# Patient Record
Sex: FEMALE | Race: WHITE | Employment: OTHER | URBAN - METROPOLITAN AREA
[De-identification: names, ages, dates, MRNs, and addresses within clinical notes are randomized per-mention and may not be internally consistent; named-entity substitution may affect disease eponyms.]

---

## 2017-02-13 ENCOUNTER — TELEPHONE (OUTPATIENT)
Dept: INTERNAL MEDICINE CLINIC | Facility: CLINIC | Age: 82
End: 2017-02-13

## 2017-02-13 NOTE — TELEPHONE ENCOUNTER
Incoming (mail or fax):  fax  Received from:  Sergo Washburn. 56. of [facility] SPECIALTY [facility]  Documentation given to:  Dr Fabian Marquez

## 2017-03-28 ENCOUNTER — OFFICE VISIT (OUTPATIENT)
Dept: INTERNAL MEDICINE CLINIC | Facility: CLINIC | Age: 82
End: 2017-03-28

## 2017-03-28 VITALS
DIASTOLIC BLOOD PRESSURE: 70 MMHG | HEIGHT: 64 IN | SYSTOLIC BLOOD PRESSURE: 140 MMHG | WEIGHT: 179 LBS | RESPIRATION RATE: 16 BRPM | BODY MASS INDEX: 30.56 KG/M2 | TEMPERATURE: 98 F | HEART RATE: 88 BPM

## 2017-03-28 DIAGNOSIS — M54.50 CHRONIC BILATERAL LOW BACK PAIN WITHOUT SCIATICA: ICD-10-CM

## 2017-03-28 DIAGNOSIS — R06.00 DYSPNEA ON EXERTION: ICD-10-CM

## 2017-03-28 DIAGNOSIS — Z91.81 AT HIGH RISK FOR FALLS: ICD-10-CM

## 2017-03-28 DIAGNOSIS — Z00.00 ENCOUNTER FOR MEDICARE ANNUAL WELLNESS EXAM: Primary | ICD-10-CM

## 2017-03-28 DIAGNOSIS — G89.29 CHRONIC BILATERAL LOW BACK PAIN WITHOUT SCIATICA: ICD-10-CM

## 2017-03-28 DIAGNOSIS — I10 ESSENTIAL HYPERTENSION: ICD-10-CM

## 2017-03-28 DIAGNOSIS — I77.9 CAROTID ARTERY DISEASE, UNSPECIFIED LATERALITY (HCC): ICD-10-CM

## 2017-03-28 PROCEDURE — 99214 OFFICE O/P EST MOD 30 MIN: CPT | Performed by: INTERNAL MEDICINE

## 2017-03-28 PROCEDURE — G0439 PPPS, SUBSEQ VISIT: HCPCS | Performed by: INTERNAL MEDICINE

## 2017-03-28 NOTE — PATIENT INSTRUCTIONS
Shai Gupta's SCREENING SCHEDULE   Tests on this list are recommended by your physician but may not be covered, or covered at this frequency, by your insurer. Please check with your insurance carrier before scheduling to verify coverage.    Fredy Damon years old and have smoked more than 100 cigarettes in their lifetime   • Anyone with a family history   NA   Colorectal Cancer Screening  Covered up to Age 76     Colonoscopy Screen   Covered every 10 years- more often if abnormal Colonoscopy,10 Years due Influenza  Covered Annually No orders found for this or any previous visit.  Please get every year  DONE    Pneumococcal 13 (Prevnar)  Covered Once after 65   Orders placed or performed in visit on 09/04/15  -PNEUMOCOCCAL VACC, 13 SONALI IM    Please get once Advance Directives.

## 2017-03-28 NOTE — PROGRESS NOTES
HPI:   Agustina Ornelas is a 80year old female who presents for a med check and Medicare Subsequent Annual Wellness visit (Pt already had Initial Annual Wellness). Htn: diet controlled. No chest pain. Has dyspnea on exertion.     Dyspnea on exertion Osteoarthritis, multiple sites     Lumbar spinal stenosis     Cervical spine degeneration     Essential hypertension     Esophageal reflux     DJD (degenerative joint disease)     Vertebrobasilar artery syndrome     SSS (sick sinus syndrome) ([address]     Hype (12/27/2011); Cervical spine degeneration (12/27/2011); Lumbar degenerative disc disease (12/27/2011); Other screening mammogram (6/29/2012); Unspecified essential hypertension (6/29/2012); Disorder of bone and cartilage, unspecified (6/29/2012);  Libby Snow grandfather and mother; Glaucoma in her sister; Heart Disorder in her brother. SOCIAL HISTORY:   She  reports that she has quit smoking. She has never used smokeless tobacco. She reports that she does not drink alcohol or use illicit drugs.      REVIEW OF Pneumovax 09/04/2005       ASSESSMENT AND OTHER RELEVANT CHRONIC CONDITIONS:   Jie Deras is a 80year old female who presents for a Medicare Assessment.      PLAN SUMMARY:   Diagnoses and all orders for this visit:    Encounter for Medicare Emiliano Mckeon Yes    How does the patient maintain a good energy level? :  (works around the house for a hour then rests, repeats several times per day)    How would you describe your daily physical activity?: Light    How would you describe your current health state?: F Yes    Do you have a living will?: Yes     Please go to \"Cognitive Assessment\" under Medicare Assessment section in Charting, test patient and document. Then, refresh your progress note to see your input here.   Cognitive Assessment     What day of the Annually if high risk No results found for: CHLAMYDIA No flowsheet data found.     Screening Mammogram      Mammogram Annually to 76, then as discussed Mammogram,1 Yr due on 12/17/2011 Update Health Maintenance if applicable     Immunizations (Update Immuni A1C, HGBA1C No flowsheet data found. Creat/alb ratio  Annually      LDL  Annually LDL CHOLESTEROL (mg/dL)   Date Value   09/27/2016 108     LDL CHOLESTROL (mg/dL)   Date Value   12/23/2013 120    No flowsheet data found.      Dilated Eye exam  Annually N

## 2017-04-05 PROCEDURE — 88108 CYTOPATH CONCENTRATE TECH: CPT | Performed by: UROLOGY

## 2017-04-22 ENCOUNTER — TELEPHONE (OUTPATIENT)
Dept: INTERNAL MEDICINE CLINIC | Facility: CLINIC | Age: 82
End: 2017-04-22

## 2017-04-22 NOTE — TELEPHONE ENCOUNTER
Incoming (mail or fax):   Fax  Received from:  Sergo Delgado. of [facility]   Documentation given to:  Dr. Pilar Gaffney

## 2017-04-28 ENCOUNTER — PRIOR ORIGINAL RECORDS (OUTPATIENT)
Dept: OTHER | Age: 82
End: 2017-04-28

## 2017-04-28 ENCOUNTER — MYAURORA ACCOUNT LINK (OUTPATIENT)
Dept: OTHER | Age: 82
End: 2017-04-28

## 2017-05-25 ENCOUNTER — HOSPITAL ENCOUNTER (OUTPATIENT)
Dept: CV DIAGNOSTICS | Facility: HOSPITAL | Age: 82
Discharge: HOME | End: 2017-05-25
Attending: INTERNAL MEDICINE

## 2017-05-25 ENCOUNTER — MYAURORA ACCOUNT LINK (OUTPATIENT)
Dept: OTHER | Age: 82
End: 2017-05-25

## 2017-05-25 ENCOUNTER — HOSPITAL ENCOUNTER (OUTPATIENT)
Dept: CARDIOLOGY CLINIC | Facility: HOSPITAL | Age: 82
Discharge: HOME | End: 2017-05-25
Attending: INTERNAL MEDICINE

## 2017-05-25 DIAGNOSIS — I25.10 CORONARY ARTERIOSCLEROSIS: ICD-10-CM

## 2017-05-25 DIAGNOSIS — I34.1 MVP (MITRAL VALVE PROLAPSE): ICD-10-CM

## 2017-05-25 DIAGNOSIS — I65.23 BILATERAL CAROTID ARTERY STENOSIS: ICD-10-CM

## 2017-05-31 ENCOUNTER — PRIOR ORIGINAL RECORDS (OUTPATIENT)
Dept: OTHER | Age: 82
End: 2017-05-31

## 2017-07-12 ENCOUNTER — TELEPHONE (OUTPATIENT)
Dept: INTERNAL MEDICINE CLINIC | Facility: CLINIC | Age: 82
End: 2017-07-12

## 2017-07-12 NOTE — TELEPHONE ENCOUNTER
Incoming (mail or fax): Fax  Received from:   Dr. Staci Vidal at [address] [place] [address]  Documentation given to:  Dr. Tomás Caban consult folder.

## 2017-08-15 ENCOUNTER — PRIOR ORIGINAL RECORDS (OUTPATIENT)
Dept: OTHER | Age: 82
End: 2017-08-15

## 2017-08-15 ENCOUNTER — APPOINTMENT (OUTPATIENT)
Dept: LAB | Age: 82
End: 2017-08-15
Attending: INTERNAL MEDICINE
Payer: MEDICARE

## 2017-08-15 DIAGNOSIS — I10 ESSENTIAL HYPERTENSION: ICD-10-CM

## 2017-08-15 LAB
ALBUMIN SERPL-MCNC: 3.5 G/DL (ref 3.5–4.8)
ALP LIVER SERPL-CCNC: 89 U/L (ref 55–142)
ALT SERPL-CCNC: 14 U/L (ref 14–54)
AST SERPL-CCNC: 16 U/L (ref 15–41)
BILIRUB SERPL-MCNC: 0.5 MG/DL (ref 0.1–2)
BUN BLD-MCNC: 19 MG/DL (ref 8–20)
CALCIUM BLD-MCNC: 9.1 MG/DL (ref 8.3–10.3)
CHLORIDE: 107 MMOL/L (ref 101–111)
CHOLEST SMN-MCNC: 190 MG/DL (ref ?–200)
CO2: 28 MMOL/L (ref 22–32)
CREAT BLD-MCNC: 0.99 MG/DL (ref 0.55–1.02)
GLUCOSE BLD-MCNC: 93 MG/DL (ref 70–99)
HDLC SERPL-MCNC: 68 MG/DL (ref 45–?)
HDLC SERPL: 2.79 {RATIO} (ref ?–4.44)
LDLC SERPL CALC-MCNC: 108 MG/DL (ref ?–130)
LDLC SERPL-MCNC: 14 MG/DL (ref 5–40)
M PROTEIN MFR SERPL ELPH: 7.1 G/DL (ref 6.1–8.3)
NONHDLC SERPL-MCNC: 122 MG/DL (ref ?–130)
POTASSIUM SERPL-SCNC: 4.3 MMOL/L (ref 3.6–5.1)
SODIUM SERPL-SCNC: 142 MMOL/L (ref 136–144)
TRIGLYCERIDES: 71 MG/DL (ref ?–150)

## 2017-08-15 PROCEDURE — 80061 LIPID PANEL: CPT

## 2017-08-15 PROCEDURE — 80053 COMPREHEN METABOLIC PANEL: CPT

## 2017-08-15 PROCEDURE — 36415 COLL VENOUS BLD VENIPUNCTURE: CPT

## 2017-08-17 ENCOUNTER — OFFICE VISIT (OUTPATIENT)
Dept: INTERNAL MEDICINE CLINIC | Facility: CLINIC | Age: 82
End: 2017-08-17

## 2017-08-17 ENCOUNTER — APPOINTMENT (OUTPATIENT)
Dept: LAB | Age: 82
End: 2017-08-17
Attending: INTERNAL MEDICINE
Payer: MEDICARE

## 2017-08-17 ENCOUNTER — TELEPHONE (OUTPATIENT)
Dept: INTERNAL MEDICINE CLINIC | Facility: CLINIC | Age: 82
End: 2017-08-17

## 2017-08-17 VITALS
RESPIRATION RATE: 16 BRPM | HEIGHT: 64 IN | HEART RATE: 88 BPM | TEMPERATURE: 98 F | SYSTOLIC BLOOD PRESSURE: 146 MMHG | WEIGHT: 181 LBS | BODY MASS INDEX: 30.9 KG/M2 | DIASTOLIC BLOOD PRESSURE: 78 MMHG

## 2017-08-17 DIAGNOSIS — R94.01 ABNORMAL EEG: ICD-10-CM

## 2017-08-17 DIAGNOSIS — R51.9 HEADACHE, UNSPECIFIED HEADACHE TYPE: ICD-10-CM

## 2017-08-17 DIAGNOSIS — I10 ESSENTIAL HYPERTENSION: ICD-10-CM

## 2017-08-17 DIAGNOSIS — R51.9 HEADACHE, UNSPECIFIED HEADACHE TYPE: Primary | ICD-10-CM

## 2017-08-17 DIAGNOSIS — T14.8XXA BRUISING: ICD-10-CM

## 2017-08-17 LAB — SED RATE-ML: 31 MM/HR (ref 0–25)

## 2017-08-17 PROCEDURE — 85652 RBC SED RATE AUTOMATED: CPT

## 2017-08-17 PROCEDURE — 36415 COLL VENOUS BLD VENIPUNCTURE: CPT

## 2017-08-17 PROCEDURE — 99214 OFFICE O/P EST MOD 30 MIN: CPT | Performed by: INTERNAL MEDICINE

## 2017-08-17 NOTE — PROGRESS NOTES
[facility]    CHIEF COMPLAINT:  Patient presents with:  Headache: headaches off and on x several months. Headache over right temple area and around the right eye.    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    Umang Carter MD

## 2017-09-20 ENCOUNTER — OFFICE VISIT (OUTPATIENT)
Dept: INTERNAL MEDICINE CLINIC | Facility: CLINIC | Age: 82
End: 2017-09-20

## 2017-09-20 VITALS
BODY MASS INDEX: 30.9 KG/M2 | HEART RATE: 72 BPM | SYSTOLIC BLOOD PRESSURE: 138 MMHG | RESPIRATION RATE: 16 BRPM | DIASTOLIC BLOOD PRESSURE: 72 MMHG | WEIGHT: 181 LBS | HEIGHT: 64 IN

## 2017-09-20 DIAGNOSIS — I10 ESSENTIAL HYPERTENSION: ICD-10-CM

## 2017-09-20 DIAGNOSIS — E78.5 HYPERLIPIDEMIA, UNSPECIFIED HYPERLIPIDEMIA TYPE: Primary | ICD-10-CM

## 2017-09-20 DIAGNOSIS — R94.01 ABNORMAL EEG: ICD-10-CM

## 2017-09-20 DIAGNOSIS — M25.552 LEFT HIP PAIN: ICD-10-CM

## 2017-09-20 PROCEDURE — 99214 OFFICE O/P EST MOD 30 MIN: CPT | Performed by: INTERNAL MEDICINE

## 2017-09-20 PROCEDURE — 90653 IIV ADJUVANT VACCINE IM: CPT | Performed by: INTERNAL MEDICINE

## 2017-09-20 PROCEDURE — G0008 ADMIN INFLUENZA VIRUS VAC: HCPCS | Performed by: INTERNAL MEDICINE

## 2017-09-20 NOTE — PROGRESS NOTES
[address] [facility]    CHIEF COMPLAINT:  Patient presents with: Follow - Up: from last OV        HISTORY OF PRESENT ILLNESS:  Here for follow up   Headache is now resolved.  She sees neuro for abnormal eeg and is on lamictal. She was started on this when Value Ref Range   Sed Rate 31 (H) 0 - 25 mm/Hr            ASSESSMENT AND PLAN:  1. Hyperlipidemia, unspecified hyperlipidemia type  Diet control. Labs ordered to be done prior to next visit. - LIPID PANEL; Future  - COMP METABOLIC PANEL (14);  Future

## 2017-09-21 ENCOUNTER — TELEPHONE (OUTPATIENT)
Dept: INTERNAL MEDICINE CLINIC | Facility: CLINIC | Age: 82
End: 2017-09-21

## 2017-09-21 NOTE — TELEPHONE ENCOUNTER
See if she can see one of his partners or PA sooner  Curb activity, looks like it hurts most when she gets up and walks

## 2017-09-21 NOTE — TELEPHONE ENCOUNTER
Spoke to patient and relayed message below. Patient stated she would follow Dr. Felicitas Dhaliwal instructions.

## 2017-09-21 NOTE — TELEPHONE ENCOUNTER
Patient phoned. She reports that she saw Dr. Enzo Valle yesterday and that a visit with an orthopaedic was recommended.   Patient called to make an appointment, and the next available appointment she can get with someone in the practice (Dr. Adrienne Granados) is two wee

## 2017-10-06 PROBLEM — M70.62 TROCHANTERIC BURSITIS OF LEFT HIP: Status: ACTIVE | Noted: 2017-10-06

## 2018-02-19 ENCOUNTER — TELEPHONE (OUTPATIENT)
Dept: INTERNAL MEDICINE CLINIC | Facility: CLINIC | Age: 83
End: 2018-02-19

## 2018-03-05 ENCOUNTER — OFFICE VISIT (OUTPATIENT)
Dept: INTERNAL MEDICINE CLINIC | Facility: CLINIC | Age: 83
End: 2018-03-05

## 2018-03-05 VITALS
BODY MASS INDEX: 30.8 KG/M2 | SYSTOLIC BLOOD PRESSURE: 130 MMHG | DIASTOLIC BLOOD PRESSURE: 84 MMHG | WEIGHT: 180.38 LBS | TEMPERATURE: 98 F | RESPIRATION RATE: 12 BRPM | HEIGHT: 64 IN | HEART RATE: 100 BPM

## 2018-03-05 DIAGNOSIS — R44.1 VISUAL HALLUCINATIONS: ICD-10-CM

## 2018-03-05 DIAGNOSIS — G89.29 CHRONIC BILATERAL BACK PAIN, UNSPECIFIED BACK LOCATION: ICD-10-CM

## 2018-03-05 DIAGNOSIS — I10 ESSENTIAL HYPERTENSION: Primary | ICD-10-CM

## 2018-03-05 DIAGNOSIS — R94.01 ABNORMAL EEG: ICD-10-CM

## 2018-03-05 DIAGNOSIS — E78.5 HYPERLIPIDEMIA, UNSPECIFIED HYPERLIPIDEMIA TYPE: ICD-10-CM

## 2018-03-05 DIAGNOSIS — M54.9 CHRONIC BILATERAL BACK PAIN, UNSPECIFIED BACK LOCATION: ICD-10-CM

## 2018-03-05 PROCEDURE — 99214 OFFICE O/P EST MOD 30 MIN: CPT | Performed by: INTERNAL MEDICINE

## 2018-03-05 NOTE — PROGRESS NOTES
[address] [facility]    CHIEF COMPLAINT:  Patient presents with: Follow - Up: headaches and other concerns        HISTORY OF PRESENT ILLNESS:  Here for follow up. Htn: off meds. No chest pain, no shortness of breath at goal. At goal today.      Hyperlipi unspecified hyperlipidemia type  Off meds. Do labs prior to next visit. 3. Abnormal EEG  Continue lamictal.   follow up with dr. Brett Olvera. 4. Visual hallucinations  Do the repeat eeg and follow up with dr. Brett Olvera.    Discussed possible psych evaluatio

## 2018-03-16 ENCOUNTER — APPOINTMENT (OUTPATIENT)
Dept: GENERAL RADIOLOGY | Age: 83
End: 2018-03-16
Attending: EMERGENCY MEDICINE
Payer: MEDICARE

## 2018-03-16 ENCOUNTER — HOSPITAL ENCOUNTER (OUTPATIENT)
Age: 83
Discharge: HOME | End: 2018-03-16
Attending: EMERGENCY MEDICINE
Payer: MEDICARE

## 2018-03-16 VITALS
SYSTOLIC BLOOD PRESSURE: 165 MMHG | WEIGHT: 178 LBS | HEART RATE: 96 BPM | RESPIRATION RATE: 20 BRPM | BODY MASS INDEX: 30.39 KG/M2 | OXYGEN SATURATION: 97 % | DIASTOLIC BLOOD PRESSURE: 74 MMHG | TEMPERATURE: 99 F | HEIGHT: 64 IN

## 2018-03-16 DIAGNOSIS — J20.9 ACUTE BRONCHITIS, UNSPECIFIED ORGANISM: Primary | ICD-10-CM

## 2018-03-16 PROCEDURE — 99204 OFFICE O/P NEW MOD 45 MIN: CPT

## 2018-03-16 PROCEDURE — 99213 OFFICE O/P EST LOW 20 MIN: CPT

## 2018-03-16 PROCEDURE — 71046 X-RAY EXAM CHEST 2 VIEWS: CPT | Performed by: EMERGENCY MEDICINE

## 2018-03-16 RX ORDER — BENZONATATE 100 MG/1
100 CAPSULE ORAL 3 TIMES DAILY PRN
Qty: 30 CAPSULE | Refills: 0 | Status: SHIPPED | OUTPATIENT
Start: 2018-03-16 | End: 2018-03-23 | Stop reason: ALTCHOICE

## 2018-03-16 RX ORDER — AZITHROMYCIN 250 MG/1
TABLET, FILM COATED ORAL
Qty: 1 PACKAGE | Refills: 0 | Status: SHIPPED | OUTPATIENT
Start: 2018-03-16 | End: 2018-03-21

## 2018-03-16 NOTE — ED INITIAL ASSESSMENT (HPI)
Pt c/o cough and weakness that started about 1 week ago.  recently with a resp infection was treated with zpack.   States she is always short of breath but doesn't feel it is any worse than normal. Only has pain in the chest after she has been cough

## 2018-03-16 NOTE — ED PROVIDER NOTES
Patient Seen in: [facility]    History   Patient presents with:  Cough/URI    Stated Complaint: cough, weak, chest pain, x3days    HPI    27-year-old female presents to the emergency department complaining of a 3 day histor Lumbar spinal stenosis 12/27/2011   • Macular hole    • Miscarriage 1959   • Osteoarthritis, multiple sites 12/27/2011   • Osteoarthrosis, unspecified whether generalized or localized, unspecified site 6/29/2012   • Other screening mammogram 6/29/2012   • Physical Exam    General appearance: This is an elderly female lying in a gurney. Vital signs were reviewed per nurse's notes. HEENT: Normocephalic atraumatic. Anicteric sclera.   Tympanic memories and oropharynx are normal.  Oral mucosa is moist

## 2018-03-20 ENCOUNTER — TELEPHONE (OUTPATIENT)
Dept: INTERNAL MEDICINE CLINIC | Facility: CLINIC | Age: 83
End: 2018-03-20

## 2018-03-20 NOTE — TELEPHONE ENCOUNTER
Spoke with pt. Seen in  on 3/16/18 for bronchitis. Chest xray clear, no pneumonia. Took last zpack dose today at 2pm, does not feel much better overall. Benzonatate was not helpful.  Feels somewhat weak which fluctuates throughout day, better after restin

## 2018-03-20 NOTE — TELEPHONE ENCOUNTER
Patient was seen next door Urgent Care on 3/16/18, patient called stating she is not feeling better, very weak and still coughing.  Patient stated today will be her last pill from her z-lindsay, patient wanted to schedule a follow up visit with Dr. Suzanne Larios, please

## 2018-03-22 ENCOUNTER — TELEPHONE (OUTPATIENT)
Dept: INTERNAL MEDICINE CLINIC | Facility: CLINIC | Age: 83
End: 2018-03-22

## 2018-03-22 NOTE — TELEPHONE ENCOUNTER
Called and spoke with the patient. The  DC her on 03/16/18 with a Z-Woodrow and Benzonatate cough medicine. The patient states that her symptoms are better (less coughing and less SOB), and she feels better overall.  The patient states she has no fever, but a

## 2018-03-22 NOTE — TELEPHONE ENCOUNTER
Please triage this 81 y/o coming in to see me tomorrow for betsy from , was not feeling better on phone call 3/20

## 2018-03-23 ENCOUNTER — OFFICE VISIT (OUTPATIENT)
Dept: INTERNAL MEDICINE CLINIC | Facility: CLINIC | Age: 83
End: 2018-03-23

## 2018-03-23 VITALS
HEIGHT: 64 IN | RESPIRATION RATE: 16 BRPM | DIASTOLIC BLOOD PRESSURE: 80 MMHG | OXYGEN SATURATION: 96 % | TEMPERATURE: 98 F | SYSTOLIC BLOOD PRESSURE: 140 MMHG | WEIGHT: 176 LBS | HEART RATE: 101 BPM | BODY MASS INDEX: 30.05 KG/M2

## 2018-03-23 DIAGNOSIS — J06.9 URI WITH COUGH AND CONGESTION: Primary | ICD-10-CM

## 2018-03-23 PROCEDURE — 99213 OFFICE O/P EST LOW 20 MIN: CPT | Performed by: INTERNAL MEDICINE

## 2018-03-23 NOTE — PROGRESS NOTES
Conrado Gonzalez is a 80year old female  Patient presents with:  Cough: past 10 days       HPI:   Phone call yestserday:     Called and spoke with the patient. The  DC her on 03/16/18 with a Z-Woodrow and Benzonatate cough medicine.  The patient states t replacement of left hip     Trochanteric bursitis of left hip     Social History:  Smoking status: Former Smoker                                                              Packs/day: 0.00      Years: 2.00      Smokeless tobacco: Never Used FINDINGS:  The patient is rotated to the right of midline. This obscures a portion of the right upper chest. Normal heart size and pulmonary vascularity. No pleural effusion or pneumothorax. No lobar consolidation.   Tortuous thoracic aorta with   calcif

## 2018-03-28 ENCOUNTER — TELEPHONE (OUTPATIENT)
Dept: INTERNAL MEDICINE CLINIC | Facility: CLINIC | Age: 83
End: 2018-03-28

## 2018-03-28 NOTE — TELEPHONE ENCOUNTER
Called Dr. Dio Barfield they are no longer doing regular foot care for medicare patients. Called Dr Celi Blue office and verified they do take her insurance. Called and notified patient. Patient verbalized understanding.  Patient will call and see wh

## 2018-05-07 ENCOUNTER — MYAURORA ACCOUNT LINK (OUTPATIENT)
Dept: OTHER | Age: 83
End: 2018-05-07

## 2018-05-07 ENCOUNTER — PRIOR ORIGINAL RECORDS (OUTPATIENT)
Dept: OTHER | Age: 83
End: 2018-05-07

## 2018-05-09 LAB
ALBUMIN: 3.5 G/DL
ALKALINE PHOSPHATATE(ALK PHOS): 89 IU/L
BILIRUBIN TOTAL: 0.5 MG/DL
BUN: 19 MG/DL
CALCIUM: 9.1 MG/DL
CHLORIDE: 107 MEQ/L
CHOLESTEROL, TOTAL: 190 MG/DL
CREATININE, SERUM: 0.99 MG/DL
GLUCOSE: 93 MG/DL
HDL CHOLESTEROL: 68 MG/DL
LDL CHOLESTEROL: 108 MG/DL
POTASSIUM, SERUM: 4.3 MEQ/L
PROTEIN, TOTAL: 7.1 G/DL
SGOT (AST): 16 IU/L
SGPT (ALT): 14 IU/L
SODIUM: 142 MEQ/L
TRIGLYCERIDES: 71 MG/DL

## 2018-05-22 ENCOUNTER — TELEPHONE (OUTPATIENT)
Dept: INTERNAL MEDICINE CLINIC | Facility: CLINIC | Age: 83
End: 2018-05-22

## 2018-05-22 NOTE — TELEPHONE ENCOUNTER
Incoming (mail or fax):  fax  Received from:  Dr. Iris Han  Documentation given to:  Dr. Melinda Subramanian

## 2018-05-31 PROBLEM — H90.3 SENSORINEURAL HEARING LOSS (SNHL) OF BOTH EARS: Status: ACTIVE | Noted: 2018-05-31

## 2018-06-14 ENCOUNTER — APPOINTMENT (OUTPATIENT)
Dept: LAB | Age: 83
End: 2018-06-14
Attending: INTERNAL MEDICINE
Payer: MEDICARE

## 2018-06-14 DIAGNOSIS — E78.5 HYPERLIPIDEMIA, UNSPECIFIED HYPERLIPIDEMIA TYPE: ICD-10-CM

## 2018-06-14 PROCEDURE — 80061 LIPID PANEL: CPT

## 2018-06-14 PROCEDURE — 36415 COLL VENOUS BLD VENIPUNCTURE: CPT

## 2018-06-14 PROCEDURE — 80053 COMPREHEN METABOLIC PANEL: CPT

## 2018-06-27 ENCOUNTER — OFFICE VISIT (OUTPATIENT)
Dept: INTERNAL MEDICINE CLINIC | Facility: CLINIC | Age: 83
End: 2018-06-27

## 2018-06-27 VITALS
TEMPERATURE: 98 F | HEART RATE: 80 BPM | DIASTOLIC BLOOD PRESSURE: 72 MMHG | HEIGHT: 64 IN | WEIGHT: 175.88 LBS | SYSTOLIC BLOOD PRESSURE: 144 MMHG | BODY MASS INDEX: 30.03 KG/M2 | RESPIRATION RATE: 16 BRPM

## 2018-06-27 DIAGNOSIS — R94.01 ABNORMAL EEG: ICD-10-CM

## 2018-06-27 DIAGNOSIS — I65.29 STENOSIS OF CAROTID ARTERY, UNSPECIFIED LATERALITY: ICD-10-CM

## 2018-06-27 DIAGNOSIS — Z00.00 ENCOUNTER FOR ANNUAL HEALTH EXAMINATION: ICD-10-CM

## 2018-06-27 DIAGNOSIS — I10 ESSENTIAL HYPERTENSION: ICD-10-CM

## 2018-06-27 PROCEDURE — G0439 PPPS, SUBSEQ VISIT: HCPCS | Performed by: INTERNAL MEDICINE

## 2018-06-27 PROCEDURE — 99213 OFFICE O/P EST LOW 20 MIN: CPT | Performed by: INTERNAL MEDICINE

## 2018-06-27 RX ORDER — DORZOLAMIDE HCL 20 MG/ML
1 SOLUTION/ DROPS OPHTHALMIC 2 TIMES DAILY
COMMUNITY
End: 2021-06-14 | Stop reason: CLARIF

## 2018-06-27 NOTE — PATIENT INSTRUCTIONS
Perri Gupta's SCREENING SCHEDULE   Tests on this list are recommended by your physician but may not be covered, or covered at this frequency, by your insurer. Please check with your insurance carrier before scheduling to verify coverage.    Jessiac Elkins criteria:   • Men who are 73-68 years old and have smoked more than 100 cigarettes in their lifetime   • Anyone with a family history    Colorectal Cancer Screening  Covered up to Age 76     Colonoscopy Screen   Covered every 10 years- more often if abnorm Immunizations      Influenza  Covered Annually No orders found for this or any previous visit.  Please get every year    Pneumococcal 13 (Prevnar)  Covered Once after 65   Orders placed or performed in visit on 09/04/15  -PNEUMOCOCCAL VACC, 13 SONALI IM    P

## 2018-06-27 NOTE — PROGRESS NOTES
HPI:   María Louis is a 80year old female who presents for a med check and Medicare Subsequent Annual Wellness visit (Pt already had Initial Annual Wellness). Htn: slightly elevated today. Not on meds any more.  Has been well controlled at home on screening of functional status. Vision Problems? : Yes   She has Walking problems based on screening of functional status. Difficulty walking?: Yes   She has problems with Daily Activities based on screening of functional status.    Problems with leo bowel syndrome)     Diverticulosis     Internal hemorrhoids     Hearing loss     Change in vision     Abnormal EEG     Status post total left knee replacement     Status post total replacement of left hip     Trochanteric bursitis of left hip     Sensorine habits; Change in vision (6/14/2012); Chronic neck pain (3/5/2012); Conjunctivitis; Constipation (6/6/2012); Disorder of bone and cartilage, unspecified (6/29/2012); Diverticulosis (3/3/2005);  Dizziness (6/14/2012); DJD (degenerative joint disease); DJD (d reports that she has quit smoking. She quit after 2.00 years of use. She has never used smokeless tobacco. She reports that she does not drink alcohol or use drugs.      REVIEW OF SYSTEMS:   See hpi    EXAM:   /72 (BP Location: Left arm, Patient Posit testing normal.   High fall risk. Discussed fall precautions. Seeing eye doctor yearly. Essential hypertension  -     COMP METABOLIC PANEL (14); Future  Diet controlled. Will monitor.      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 No flowsheet data found. Fecal Occult Blood Annually No results found for: FOB No flowsheet data found.     Glaucoma Screening      Ophthalmology Visit Annually: Diabetics, FHx Glaucoma, AA>50, > 65 No flowsheet d MONITORING Internal Lab or Procedure External Lab or Procedure      Annual Monitoring of Persistent     Medications (ACE/ARB, digoxin diuretics, anticonvulsants.)    Potassium  Annually Potassium (mmol/L)   Date Value   06/14/2018 4.1     POTASSIUM (mmol/L

## 2018-10-06 ENCOUNTER — HOSPITAL ENCOUNTER (OUTPATIENT)
Age: 83
Discharge: ANOTHER HOSPITAL | End: 2018-10-06
Attending: FAMILY MEDICINE
Payer: MEDICARE

## 2018-10-06 ENCOUNTER — HOSPITAL ENCOUNTER (EMERGENCY)
Facility: HOSPITAL | Age: 83
Discharge: HOME | End: 2018-10-06
Attending: EMERGENCY MEDICINE
Payer: MEDICARE

## 2018-10-06 ENCOUNTER — APPOINTMENT (OUTPATIENT)
Dept: ULTRASOUND IMAGING | Facility: HOSPITAL | Age: 83
End: 2018-10-06
Attending: EMERGENCY MEDICINE
Payer: MEDICARE

## 2018-10-06 VITALS
WEIGHT: 170 LBS | BODY MASS INDEX: 29.02 KG/M2 | HEART RATE: 95 BPM | HEIGHT: 64 IN | RESPIRATION RATE: 16 BRPM | OXYGEN SATURATION: 96 % | TEMPERATURE: 99 F | SYSTOLIC BLOOD PRESSURE: 167 MMHG | DIASTOLIC BLOOD PRESSURE: 88 MMHG

## 2018-10-06 VITALS
DIASTOLIC BLOOD PRESSURE: 78 MMHG | OXYGEN SATURATION: 98 % | TEMPERATURE: 97 F | HEIGHT: 64 IN | WEIGHT: 178 LBS | RESPIRATION RATE: 16 BRPM | BODY MASS INDEX: 30.39 KG/M2 | HEART RATE: 94 BPM | SYSTOLIC BLOOD PRESSURE: 167 MMHG

## 2018-10-06 DIAGNOSIS — M79.89 LEFT LEG SWELLING: Primary | ICD-10-CM

## 2018-10-06 DIAGNOSIS — R60.9 PERIPHERAL EDEMA: Primary | ICD-10-CM

## 2018-10-06 PROCEDURE — 99284 EMERGENCY DEPT VISIT MOD MDM: CPT

## 2018-10-06 PROCEDURE — 93971 EXTREMITY STUDY: CPT | Performed by: EMERGENCY MEDICINE

## 2018-10-06 PROCEDURE — 99212 OFFICE O/P EST SF 10 MIN: CPT

## 2018-10-06 PROCEDURE — 99213 OFFICE O/P EST LOW 20 MIN: CPT

## 2018-10-06 RX ORDER — FUROSEMIDE 20 MG/1
20 TABLET ORAL DAILY
Qty: 3 TABLET | Refills: 0 | Status: SHIPPED | OUTPATIENT
Start: 2018-10-06 | End: 2018-10-16

## 2018-10-06 NOTE — ED PROVIDER NOTES
Patient Seen in: [facility] Emergency Department    History   Patient presents with:  Deep Vein Thrombosis (cardiovascular)  Swelling Edema (cardiovascular, metabolic)    Stated Complaint: r/o dvt    HPI    55-year-old female presents emergency depa Lumbar degenerative disc disease 12/27/2011   • Lumbar spinal stenosis 12/27/2011   • Macular hole    • Miscarriage 1959   • Osteoarthritis, multiple sites 12/27/2011   • Osteoarthrosis, unspecified whether generalized or localized, unspecified site 6/29/2 no acute distress  HEENT: Pupils equal react to light extraocular muscles intact no scleral icterus, mucous membranes are moist, there is no erythema or exudate in the posterior pharynx  Neck: Supple no JVD no lymphadenopathy no meningismus no carotid brui Prescribed:  Current Discharge Medication List    START taking these medications    furosemide 20 MG Oral Tab  Take 1 tablet (20 mg total) by mouth daily.   Qty: 3 tablet Refills: 0

## 2018-10-06 NOTE — ED INITIAL ASSESSMENT (HPI)
History of leg swelling and has been getting worse over the past 2 weeks. Has redness/disocoloration to the left leg and states the leg feels tighter than the right. Also states she has been itching x 3 weeks.   Denies difficulty breathing, chest pain or

## 2018-10-06 NOTE — ED PROVIDER NOTES
Patient Seen in: [address] [facility]    History   Patient presents with:  Swelling Edema (cardiovascular, metabolic)    Stated Complaint: leg swelling     HPI    80-year-old female presents today for evaluation of left lower leg swe 2003   • Lumbar degenerative disc disease 12/27/2011   • Lumbar spinal stenosis 12/27/2011   • Macular hole    • Miscarriage 1959   • Osteoarthritis, multiple sites 12/27/2011   • Osteoarthrosis, unspecified whether generalized or localized, unspecified si Physical Exam    Patient is alert oriented ×3 in no acute distress   conjunctiva clear no icterus  Neck supple full range of motion,   Lungs clear to auscultation bilaterally  Heart S1-S2 heard no murmurs no gallops  Left lower extremity is slightl

## 2018-10-12 ENCOUNTER — TELEPHONE (OUTPATIENT)
Dept: INTERNAL MEDICINE CLINIC | Facility: CLINIC | Age: 83
End: 2018-10-12

## 2018-10-12 NOTE — TELEPHONE ENCOUNTER
Patient was in the UC for a swollen left leg (from the knee down) on Saturday, 10/06/18, and was instructed to call for a follow up appointment with Dr. Ulysses Lynne.  She called today stating that the swelling of her leg is back and she wants more Furosemide, demi

## 2018-10-12 NOTE — TELEPHONE ENCOUNTER
Patient was in [facility] ER on October 6th for Peripheral Edema in her left leg. Patient was prescribed 3 days worth of furosemide and patient stated the swelling went down. Now that she is back on her feet the patient is starting to see swelling again.  Lori

## 2018-10-15 NOTE — TELEPHONE ENCOUNTER
Spoke to patient, scheduled appt in acute spot tomorrow at 10:20am, cancelled appt for 10/25/18. Patient states swelling in the leg is still present, but not worse.  Patient is keeping the foot elevated as much as possible and taking her medication as presc

## 2018-10-16 ENCOUNTER — OFFICE VISIT (OUTPATIENT)
Dept: INTERNAL MEDICINE CLINIC | Facility: CLINIC | Age: 83
End: 2018-10-16
Payer: MEDICARE

## 2018-10-16 VITALS
WEIGHT: 171.63 LBS | BODY MASS INDEX: 29.3 KG/M2 | SYSTOLIC BLOOD PRESSURE: 130 MMHG | DIASTOLIC BLOOD PRESSURE: 70 MMHG | TEMPERATURE: 98 F | HEART RATE: 76 BPM | RESPIRATION RATE: 16 BRPM | HEIGHT: 64 IN

## 2018-10-16 DIAGNOSIS — R60.0 LEG EDEMA, LEFT: Primary | ICD-10-CM

## 2018-10-16 DIAGNOSIS — I10 ESSENTIAL HYPERTENSION: ICD-10-CM

## 2018-10-16 PROCEDURE — G0008 ADMIN INFLUENZA VIRUS VAC: HCPCS | Performed by: INTERNAL MEDICINE

## 2018-10-16 PROCEDURE — 99214 OFFICE O/P EST MOD 30 MIN: CPT | Performed by: INTERNAL MEDICINE

## 2018-10-16 PROCEDURE — 90653 IIV ADJUVANT VACCINE IM: CPT | Performed by: INTERNAL MEDICINE

## 2018-10-16 RX ORDER — FUROSEMIDE 20 MG/1
20 TABLET ORAL DAILY
Qty: 7 TABLET | Refills: 0 | Status: SHIPPED | OUTPATIENT
Start: 2018-10-16 | End: 2018-11-28

## 2018-10-16 NOTE — PROGRESS NOTES
[facility]    CHIEF COMPLAINT:  Patient presents with:  ER F/U: continues to experience swelling in both legs left worse than right.    Imm/Inj: requesting flu shot if ok with Dr. Ousmane Tobar:  Here for follow up er vis Value Ref Range    Cholesterol, Total 188 <200 mg/dL    Triglycerides 67 <150 mg/dL    HDL Cholesterol 59 >45 mg/dL    LDL Cholesterol 116 <130 mg/dL    VLDL 13 5 - 40 mg/dL    Chol/HDL Ratio 3.19 <4.44    Non HDL Chol 129 <130 mg/dL   COMP METABOLIC PANEL

## 2018-10-19 ENCOUNTER — HOSPITAL ENCOUNTER (OUTPATIENT)
Dept: CT IMAGING | Facility: HOSPITAL | Age: 83
Discharge: HOME | End: 2018-10-19
Attending: INTERNAL MEDICINE
Payer: MEDICARE

## 2018-10-19 DIAGNOSIS — R60.0 LEG EDEMA, LEFT: ICD-10-CM

## 2018-10-19 PROCEDURE — 74176 CT ABD & PELVIS W/O CONTRAST: CPT | Performed by: INTERNAL MEDICINE

## 2018-10-24 NOTE — PROGRESS NOTES
Spoke to pt, aware of recommendations. Pt voiced understanding and will call central scheduling to set up appt.

## 2018-10-29 ENCOUNTER — HOSPITAL ENCOUNTER (OUTPATIENT)
Dept: ULTRASOUND IMAGING | Facility: HOSPITAL | Age: 83
Discharge: HOME | End: 2018-10-29
Attending: INTERNAL MEDICINE
Payer: MEDICARE

## 2018-10-29 DIAGNOSIS — R60.0 LEG EDEMA, LEFT: ICD-10-CM

## 2018-10-29 PROCEDURE — 93971 EXTREMITY STUDY: CPT | Performed by: INTERNAL MEDICINE

## 2018-11-16 ENCOUNTER — TELEPHONE (OUTPATIENT)
Dept: INTERNAL MEDICINE CLINIC | Facility: CLINIC | Age: 83
End: 2018-11-16

## 2018-11-16 NOTE — TELEPHONE ENCOUNTER
Dr. Iyer Later, the patient's podiatrist, called in regards to the patient's lower left leg.  He is aware that Dr. Bridger Mccoy checked for DVT, but he stated that today her lower left calf is red and warm to the touch and he stated that it appears that the swelling

## 2018-11-16 NOTE — TELEPHONE ENCOUNTER
Spoke with Dr. Diane Bowling regarding Dr. Krystin Morales phone call this morning (below). Per Dr. Diane Bowling, it's okay for Dr. Gris Moon to treat the patient with antibiotics and have the patient follow up with her.  Dr. Gris Moon prescribed Clindamycin HCl 300 mg BID x 14 da

## 2018-11-28 ENCOUNTER — OFFICE VISIT (OUTPATIENT)
Dept: INTERNAL MEDICINE CLINIC | Facility: CLINIC | Age: 83
End: 2018-11-28
Payer: MEDICARE

## 2018-11-28 VITALS
TEMPERATURE: 99 F | WEIGHT: 173.63 LBS | RESPIRATION RATE: 16 BRPM | HEART RATE: 86 BPM | SYSTOLIC BLOOD PRESSURE: 124 MMHG | DIASTOLIC BLOOD PRESSURE: 62 MMHG | BODY MASS INDEX: 29.64 KG/M2 | HEIGHT: 64 IN

## 2018-11-28 DIAGNOSIS — I89.0 LYMPHEDEMA OF LEFT LEG: ICD-10-CM

## 2018-11-28 DIAGNOSIS — N13.5 UPJ (URETEROPELVIC JUNCTION) OBSTRUCTION: Primary | ICD-10-CM

## 2018-11-28 DIAGNOSIS — I10 ESSENTIAL HYPERTENSION: ICD-10-CM

## 2018-11-28 PROCEDURE — 99214 OFFICE O/P EST MOD 30 MIN: CPT | Performed by: INTERNAL MEDICINE

## 2018-11-28 RX ORDER — CLINDAMYCIN HYDROCHLORIDE 300 MG/1
300 CAPSULE ORAL 2 TIMES DAILY
COMMUNITY
End: 2019-08-19

## 2018-11-28 RX ORDER — BETAXOLOL HYDROCHLORIDE 5 MG/ML
SOLUTION/ DROPS OPHTHALMIC
Refills: 0 | COMMUNITY
Start: 2018-11-05 | End: 2021-06-14 | Stop reason: CLARIF

## 2018-11-28 NOTE — PROGRESS NOTES
[facility]    CHIEF COMPLAINT:  Patient presents with: Follow - Up: cellulitis        HISTORY OF PRESENT ILLNESS:  Here for follow up. Still with left leg swelling. She was at podiatry and they thought the leg looked cellulitic.  She was starte bilaterally.      DATA:  Results for orders placed or performed in visit on 06/14/18   LIPID PANEL   Result Value Ref Range    Cholesterol, Total 188 <200 mg/dL    Triglycerides 67 <150 mg/dL    HDL Cholesterol 59 >45 mg/dL    LDL Cholesterol 116 <130 mg/dL

## 2019-01-01 ENCOUNTER — EXTERNAL RECORD (OUTPATIENT)
Dept: CARDIOLOGY | Age: 84
End: 2019-01-01

## 2019-01-02 ENCOUNTER — PRIOR ORIGINAL RECORDS (OUTPATIENT)
Dept: OTHER | Age: 84
End: 2019-01-02

## 2019-01-02 ENCOUNTER — MYAURORA ACCOUNT LINK (OUTPATIENT)
Dept: OTHER | Age: 84
End: 2019-01-02

## 2019-02-11 ENCOUNTER — TELEPHONE (OUTPATIENT)
Dept: INTERNAL MEDICINE CLINIC | Facility: CLINIC | Age: 84
End: 2019-02-11

## 2019-02-11 DIAGNOSIS — I89.0 LYMPHEDEMA OF LEFT LEG: Primary | ICD-10-CM

## 2019-02-11 NOTE — TELEPHONE ENCOUNTER
Spoke to patient, patient aware to call Dr. Magalys Colbert office to schedule appt. Patient expressed understanding. Information below given. Uzma Ring MD  Vascular Surgery  [facility] CV Surgery  [address] [facility] [address] [facility],[address]

## 2019-02-11 NOTE — TELEPHONE ENCOUNTER
Patient calling in complaining of her lymphedema in her legs itching, patient is wondering whilst her  is out to the store before the weather if there is anything OTC to  for this itching.  Patient denies any open spots on the leg, wounds or r

## 2019-02-11 NOTE — TELEPHONE ENCOUNTER
Per pt Dr Bernard Landa has retired, needs another recommendation please. Also, pt has a question re edema itching. Please advise.  Thank you

## 2019-02-28 VITALS
BODY MASS INDEX: 29.88 KG/M2 | HEART RATE: 98 BPM | DIASTOLIC BLOOD PRESSURE: 70 MMHG | HEIGHT: 64 IN | SYSTOLIC BLOOD PRESSURE: 132 MMHG | WEIGHT: 175 LBS

## 2019-02-28 VITALS
SYSTOLIC BLOOD PRESSURE: 132 MMHG | DIASTOLIC BLOOD PRESSURE: 80 MMHG | BODY MASS INDEX: 29.53 KG/M2 | HEART RATE: 64 BPM | RESPIRATION RATE: 16 BRPM | HEIGHT: 64 IN | WEIGHT: 173 LBS

## 2019-03-01 VITALS
SYSTOLIC BLOOD PRESSURE: 158 MMHG | WEIGHT: 179 LBS | BODY MASS INDEX: 30.56 KG/M2 | DIASTOLIC BLOOD PRESSURE: 80 MMHG | HEIGHT: 64 IN | HEART RATE: 100 BPM

## 2019-03-07 ENCOUNTER — HOSPITAL ENCOUNTER (OUTPATIENT)
Dept: OCCUPATIONAL MEDICINE | Facility: HOSPITAL | Age: 84
Setting detail: THERAPIES SERIES
Discharge: HOME | End: 2019-03-07
Attending: NURSE PRACTITIONER
Payer: MEDICARE

## 2019-03-07 PROCEDURE — 97166 OT EVAL MOD COMPLEX 45 MIN: CPT

## 2019-03-07 PROCEDURE — 97165 OT EVAL LOW COMPLEX 30 MIN: CPT

## 2019-03-07 NOTE — PROGRESS NOTES
OCCUPATIONAL THERAPY LE LYMPHEDEMA EVALUATION:   Referring Physician: Dr. Veronica Kramer  Diagnosis: lymphedema BLE      Date of Service: 3/7/2019     PATIENT SUMMARY   Mike Delaney is a 80year old y/o female who presents  With improved tho swollen bila garments. Therapist feels Pt will not need much manual lymphatic drainage tx as garments  Are working well for Pt.     [place] [employer] would benefit from skilled Occupational Therapy for : reduction of BLE excess fluid volume specifically ankles    Education or t 90 day period.    Treatment will include: Complete Decongestive Therapy: Manual Therapy, Self-Care/Home Management Training, Therapeutic Exercise, ,     Patient/Family/Caregiver was advised of these findings, precautions, and treatment options and has agree

## 2019-03-13 ENCOUNTER — HOSPITAL ENCOUNTER (OUTPATIENT)
Dept: OCCUPATIONAL MEDICINE | Facility: HOSPITAL | Age: 84
Setting detail: THERAPIES SERIES
Discharge: HOME | End: 2019-03-13
Attending: NURSE PRACTITIONER
Payer: MEDICARE

## 2019-03-13 PROCEDURE — 97140 MANUAL THERAPY 1/> REGIONS: CPT

## 2019-03-13 NOTE — PROGRESS NOTES
Dx: lymphedema BLE         Authorized # of Visits:  2/12      Next MD visit/plan renewal:  None per Pt  Fall Risk: Standard          Precautions:  Lymphedema; 61+ y/o          Subjective:   Pt reports:  Legs are reducing nicely and consistently since weari

## 2019-03-19 ENCOUNTER — LAB ENCOUNTER (OUTPATIENT)
Dept: LAB | Age: 84
End: 2019-03-19
Attending: INTERNAL MEDICINE
Payer: MEDICARE

## 2019-03-19 DIAGNOSIS — I10 ESSENTIAL HYPERTENSION: ICD-10-CM

## 2019-03-19 LAB
ALBUMIN SERPL-MCNC: 3.6 G/DL (ref 3.4–5)
ALBUMIN/GLOB SERPL: 1 {RATIO} (ref 1–2)
ALP LIVER SERPL-CCNC: 88 U/L (ref 55–142)
ALT SERPL-CCNC: 17 U/L (ref 13–56)
ANION GAP SERPL CALC-SCNC: 6 MMOL/L (ref 0–18)
AST SERPL-CCNC: 12 U/L (ref 15–37)
BILIRUB SERPL-MCNC: 0.8 MG/DL (ref 0.1–2)
BUN BLD-MCNC: 16 MG/DL (ref 7–18)
BUN/CREAT SERPL: 17 (ref 10–20)
CALCIUM BLD-MCNC: 9.4 MG/DL (ref 8.5–10.1)
CHLORIDE SERPL-SCNC: 106 MMOL/L (ref 98–107)
CO2 SERPL-SCNC: 28 MMOL/L (ref 21–32)
CREAT BLD-MCNC: 0.94 MG/DL (ref 0.55–1.02)
GLOBULIN PLAS-MCNC: 3.6 G/DL (ref 2.8–4.4)
GLUCOSE BLD-MCNC: 94 MG/DL (ref 70–99)
M PROTEIN MFR SERPL ELPH: 7.2 G/DL (ref 6.4–8.2)
OSMOLALITY SERPL CALC.SUM OF ELEC: 291 MOSM/KG (ref 275–295)
POTASSIUM SERPL-SCNC: 3.9 MMOL/L (ref 3.5–5.1)
SODIUM SERPL-SCNC: 140 MMOL/L (ref 136–145)

## 2019-03-19 PROCEDURE — 36415 COLL VENOUS BLD VENIPUNCTURE: CPT

## 2019-03-19 PROCEDURE — 80053 COMPREHEN METABOLIC PANEL: CPT

## 2019-03-20 ENCOUNTER — HOSPITAL ENCOUNTER (OUTPATIENT)
Dept: OCCUPATIONAL MEDICINE | Facility: HOSPITAL | Age: 84
Setting detail: THERAPIES SERIES
Discharge: HOME | End: 2019-03-20
Attending: NURSE PRACTITIONER
Payer: MEDICARE

## 2019-03-20 PROCEDURE — 97140 MANUAL THERAPY 1/> REGIONS: CPT

## 2019-03-20 NOTE — PROGRESS NOTES
Dx: lymphedema BLE         Authorized # of Visits:  3/12      Next MD visit/plan renewal:  None per Pt  Fall Risk: Standard          Precautions:  Lymphedema; 61+ y/o          Subjective:   Pt reports:  Legs are reducing nicely and consistently since weari

## 2019-03-25 RX ORDER — LAMOTRIGINE 25 MG/1
TABLET ORAL
COMMUNITY
Start: 2014-10-27 | End: 2020-04-26 | Stop reason: ALTCHOICE

## 2019-03-25 RX ORDER — TRAVOPROST OPHTHALMIC SOLUTION 0.04 MG/ML
SOLUTION OPHTHALMIC
COMMUNITY
End: 2020-04-26 | Stop reason: ALTCHOICE

## 2019-03-25 RX ORDER — BETAXOLOL HYDROCHLORIDE 5 MG/ML
SOLUTION/ DROPS OPHTHALMIC
COMMUNITY
End: 2020-04-26 | Stop reason: ALTCHOICE

## 2019-03-27 ENCOUNTER — HOSPITAL ENCOUNTER (OUTPATIENT)
Dept: OCCUPATIONAL MEDICINE | Facility: HOSPITAL | Age: 84
Setting detail: THERAPIES SERIES
Discharge: HOME | End: 2019-03-27
Attending: NURSE PRACTITIONER
Payer: MEDICARE

## 2019-03-27 PROCEDURE — 97140 MANUAL THERAPY 1/> REGIONS: CPT

## 2019-03-27 NOTE — PROGRESS NOTES
Dx: lymphedema BLE         Authorized # of Visits:  4/12      Next MD visit/plan renewal:  None per Pt  Fall Risk: Standard          Precautions:  Lymphedema; 61+ y/o         Discharge Summary    Pt has attended 4, cancelled 0, and no shown 0 visits in [place] lifelong. Goals:   1- Pt will be independent in daily skin care. 2 sessions. MET  2-  Pt will tolerate bilat LE modified compression via Tensogrip sleeve for 8 hours while sleeping. 2 sessions. Not needed.   3-  Pt will be independent in decongestive e

## 2019-04-03 ENCOUNTER — APPOINTMENT (OUTPATIENT)
Dept: OCCUPATIONAL MEDICINE | Facility: HOSPITAL | Age: 84
End: 2019-04-03
Attending: NURSE PRACTITIONER
Payer: MEDICARE

## 2019-04-10 ENCOUNTER — APPOINTMENT (OUTPATIENT)
Dept: OCCUPATIONAL MEDICINE | Facility: HOSPITAL | Age: 84
End: 2019-04-10
Attending: NURSE PRACTITIONER
Payer: MEDICARE

## 2019-04-17 ENCOUNTER — APPOINTMENT (OUTPATIENT)
Dept: OCCUPATIONAL MEDICINE | Facility: HOSPITAL | Age: 84
End: 2019-04-17
Attending: NURSE PRACTITIONER
Payer: MEDICARE

## 2019-04-24 ENCOUNTER — APPOINTMENT (OUTPATIENT)
Dept: OCCUPATIONAL MEDICINE | Facility: HOSPITAL | Age: 84
End: 2019-04-24
Attending: NURSE PRACTITIONER
Payer: MEDICARE

## 2019-04-29 ENCOUNTER — TELEPHONE (OUTPATIENT)
Dept: CARDIOLOGY | Age: 84
End: 2019-04-29

## 2019-04-29 DIAGNOSIS — I65.23 BILATERAL CAROTID ARTERY STENOSIS: Primary | ICD-10-CM

## 2019-05-03 ENCOUNTER — HOSPITAL ENCOUNTER (OUTPATIENT)
Dept: CARDIOLOGY CLINIC | Facility: HOSPITAL | Age: 84
Discharge: HOME | End: 2019-05-03
Attending: INTERNAL MEDICINE
Payer: MEDICARE

## 2019-05-03 DIAGNOSIS — I65.23 BILATERAL CAROTID ARTERY STENOSIS: ICD-10-CM

## 2019-05-03 PROCEDURE — 93880 EXTRACRANIAL BILAT STUDY: CPT | Performed by: INTERNAL MEDICINE

## 2019-05-06 ENCOUNTER — OFFICE VISIT (OUTPATIENT)
Dept: CARDIOLOGY | Age: 84
End: 2019-05-06

## 2019-05-06 VITALS
SYSTOLIC BLOOD PRESSURE: 130 MMHG | HEART RATE: 64 BPM | WEIGHT: 172 LBS | DIASTOLIC BLOOD PRESSURE: 70 MMHG | HEIGHT: 64 IN | BODY MASS INDEX: 29.37 KG/M2

## 2019-05-06 DIAGNOSIS — I25.10 CORONARY ARTERY DISEASE INVOLVING NATIVE CORONARY ARTERY OF NATIVE HEART WITHOUT ANGINA PECTORIS: Primary | ICD-10-CM

## 2019-05-06 DIAGNOSIS — I34.1 NONRHEUMATIC MITRAL VALVE PROLAPSE: ICD-10-CM

## 2019-05-06 DIAGNOSIS — I10 ESSENTIAL HYPERTENSION: ICD-10-CM

## 2019-05-06 DIAGNOSIS — I65.23 BILATERAL CAROTID ARTERY STENOSIS: ICD-10-CM

## 2019-05-06 DIAGNOSIS — I89.0 LYMPHEDEMA: ICD-10-CM

## 2019-05-06 DIAGNOSIS — I49.3 VENTRICULAR PREMATURE DEPOLARIZATION: ICD-10-CM

## 2019-05-06 PROCEDURE — 99214 OFFICE O/P EST MOD 30 MIN: CPT | Performed by: INTERNAL MEDICINE

## 2019-05-06 RX ORDER — LATANOPROST 50 UG/ML
1 SOLUTION/ DROPS OPHTHALMIC NIGHTLY
COMMUNITY
End: 2020-04-26 | Stop reason: ALTCHOICE

## 2019-05-06 ASSESSMENT — ENCOUNTER SYMPTOMS
ALLERGIC/IMMUNOLOGIC COMMENTS: NO NEW FOOD ALLERGIES
COUGH: 0
BRUISES/BLEEDS EASILY: 0
CHILLS: 0
SUSPICIOUS LESIONS: 0
HEMATOCHEZIA: 0
WEIGHT GAIN: 0
WEIGHT LOSS: 0
FEVER: 0
HEMOPTYSIS: 0

## 2019-06-28 ENCOUNTER — TELEPHONE (OUTPATIENT)
Dept: INTERNAL MEDICINE CLINIC | Facility: CLINIC | Age: 84
End: 2019-06-28

## 2019-06-28 NOTE — TELEPHONE ENCOUNTER
Called patient to schedule appointment for AWV (Last AWV 06-27-18). Patient stated she will call back to schedule apt.

## 2019-08-08 ENCOUNTER — TELEPHONE (OUTPATIENT)
Dept: CARDIOLOGY | Age: 84
End: 2019-08-08

## 2019-08-10 ENCOUNTER — APPOINTMENT (OUTPATIENT)
Dept: GENERAL RADIOLOGY | Facility: HOSPITAL | Age: 84
End: 2019-08-10
Payer: MEDICARE

## 2019-08-10 ENCOUNTER — HOSPITAL ENCOUNTER (EMERGENCY)
Facility: HOSPITAL | Age: 84
Discharge: HOME | End: 2019-08-10
Attending: EMERGENCY MEDICINE
Payer: MEDICARE

## 2019-08-10 VITALS
HEART RATE: 91 BPM | DIASTOLIC BLOOD PRESSURE: 82 MMHG | SYSTOLIC BLOOD PRESSURE: 163 MMHG | RESPIRATION RATE: 14 BRPM | OXYGEN SATURATION: 96 % | TEMPERATURE: 97 F | HEIGHT: 64 IN | BODY MASS INDEX: 29.02 KG/M2 | WEIGHT: 170 LBS

## 2019-08-10 DIAGNOSIS — M25.531 RIGHT WRIST PAIN: Primary | ICD-10-CM

## 2019-08-10 PROCEDURE — 99283 EMERGENCY DEPT VISIT LOW MDM: CPT

## 2019-08-10 PROCEDURE — 73110 X-RAY EXAM OF WRIST: CPT | Performed by: EMERGENCY MEDICINE

## 2019-08-10 NOTE — ED INITIAL ASSESSMENT (HPI)
Pt to ed with rpts of rt wrist pain [id number]. Pt rpts it has gotten increasingly worse since using adaptive devices on putting weight on her wrist for toileting. No obvious deformity. Hx of arthritis.

## 2019-08-10 NOTE — ED PROVIDER NOTES
Patient Seen in: [facility] Emergency Department    History   Patient presents with:  Upper Extremity Injury (musculoskeletal)    Stated Complaint:     HPI    Patient states that she has been having right wrist pain over the past several weeks.   The heart beat 2003   • Lumbar degenerative disc disease 12/27/2011   • Lumbar spinal stenosis 12/27/2011   • Macular hole    • Miscarriage 1959   • Osteoarthritis, multiple sites 12/27/2011   • Osteoarthrosis, unspecified whether generalized or localized, uns well-nourished. No distress. Musculoskeletal:   Discomfort and mild edema surrounding the patient's right wrist.  No deformity is noted. Distal CMS exam is intact. Nursing note and vitals reviewed.            ED Course   Labs Reviewed - No data to disp

## 2019-08-12 ENCOUNTER — TELEPHONE (OUTPATIENT)
Dept: CARDIOLOGY | Age: 84
End: 2019-08-12

## 2019-08-12 ENCOUNTER — TELEPHONE (OUTPATIENT)
Dept: INTERNAL MEDICINE CLINIC | Facility: CLINIC | Age: 84
End: 2019-08-12

## 2019-08-12 NOTE — TELEPHONE ENCOUNTER
Patient to [facility] ER 8/10/19 for wrist pain. Per ER report, discomfort and mild swelling noted, no deformity, no fracture, CMS intact. Patient discharged with splint. Osteopenia also found on x-ray, no previous DEXA scan noted.  Patient scheduled ER F/U for

## 2019-08-12 NOTE — TELEPHONE ENCOUNTER
Per er notes pain has been present for several weeks. Xray with no fracture. Continue to use splint. See me on 19th as planned, see me sooner if worsening symptoms.

## 2019-08-12 NOTE — TELEPHONE ENCOUNTER
NIRU pt was seen at [address]  ER 08-10-19 scheduled ER f/u apt with  08-19-19 (no openings this week with ).

## 2019-08-14 ENCOUNTER — TELEPHONE (OUTPATIENT)
Dept: INTERNAL MEDICINE CLINIC | Facility: CLINIC | Age: 84
End: 2019-08-14

## 2019-08-14 DIAGNOSIS — I87.2 VENOUS INSUFFICIENCY: Primary | ICD-10-CM

## 2019-08-14 NOTE — TELEPHONE ENCOUNTER
We can try a compression sock donning aid and see if this helps and if it is covered by medicare. She can also talk to the pharmacist as they may sell these otc as well.   dme order done.

## 2019-08-14 NOTE — TELEPHONE ENCOUNTER
Called and spoke with patient. She already has the compression sock donning device. The issue is that it usually takes her and her  for her to be able to use it, and he has also injured one of his arms.  The patient states she will have help from fam

## 2019-08-14 NOTE — TELEPHONE ENCOUNTER
Patient was in the ER on 8/10 for wrist pain/swelling and has scheduled a follow up with Dr Bridger Mccoy on 8/19/19. She also has lymphedema and needs help putting her compression socks on her legs since her legs are swollen. her  is unable to help.   Also

## 2019-08-14 NOTE — TELEPHONE ENCOUNTER
Patient in [facility] ER 8/10/19 for right wrist pain, has ER F/U visit on 8/19/19. Is requesting staff help with putting on her compression stockings. Do you have any advice for where she can get help with this?  I know we'll help her if needed when she's here

## 2019-08-19 ENCOUNTER — OFFICE VISIT (OUTPATIENT)
Dept: INTERNAL MEDICINE CLINIC | Facility: CLINIC | Age: 84
End: 2019-08-19
Payer: MEDICARE

## 2019-08-19 VITALS
HEART RATE: 76 BPM | BODY MASS INDEX: 29.34 KG/M2 | WEIGHT: 171.88 LBS | RESPIRATION RATE: 16 BRPM | HEIGHT: 64 IN | SYSTOLIC BLOOD PRESSURE: 144 MMHG | TEMPERATURE: 99 F | DIASTOLIC BLOOD PRESSURE: 60 MMHG

## 2019-08-19 DIAGNOSIS — I10 ESSENTIAL HYPERTENSION: ICD-10-CM

## 2019-08-19 DIAGNOSIS — I89.0 LYMPHEDEMA OF LEFT LEG: ICD-10-CM

## 2019-08-19 DIAGNOSIS — M25.531 RIGHT WRIST PAIN: Primary | ICD-10-CM

## 2019-08-19 PROCEDURE — 99214 OFFICE O/P EST MOD 30 MIN: CPT | Performed by: INTERNAL MEDICINE

## 2019-08-19 NOTE — PROGRESS NOTES
[facility]    CHIEF COMPLAINT:  Patient presents with:  ER F/U        HISTORY OF PRESENT ILLNESS:  Here for follow up er visit. Seen in er for right wrist pain. Had some swelling and redness.  Had been using her right wrist more to try to push h HSM or tenderness  MUSCULOSKELETAL:  no edema, muscle strength normal.     DATA:  Results for orders placed or performed in visit on 54/01/57   COMP METABOLIC PANEL (14)   Result Value Ref Range    Glucose 94 70 - 99 mg/dL    Sodium 140 136 - 145 mmol/L

## 2019-08-28 ENCOUNTER — TELEPHONE (OUTPATIENT)
Dept: INTERNAL MEDICINE CLINIC | Facility: CLINIC | Age: 84
End: 2019-08-28

## 2019-08-28 DIAGNOSIS — I89.0 LYMPHEDEMA OF LEFT LEG: Primary | ICD-10-CM

## 2019-08-29 NOTE — TELEPHONE ENCOUNTER
----- Message from Pat Herring OT sent at 8/28/2019  3:17 PM CDT -----  Regarding: change orders from PT to OT   Dear Dr Angeline Pierre for this inconvenience.  Please re-write Vivian's order for evaluation  and tx for bilat leg lymphedema changing  I

## 2019-09-04 ENCOUNTER — APPOINTMENT (OUTPATIENT)
Dept: OCCUPATIONAL MEDICINE | Facility: HOSPITAL | Age: 84
End: 2019-09-04
Attending: INTERNAL MEDICINE
Payer: MEDICARE

## 2019-09-09 ENCOUNTER — HOSPITAL ENCOUNTER (OUTPATIENT)
Dept: OCCUPATIONAL MEDICINE | Facility: HOSPITAL | Age: 84
Setting detail: THERAPIES SERIES
Discharge: HOME | End: 2019-09-09
Attending: INTERNAL MEDICINE
Payer: MEDICARE

## 2019-09-09 DIAGNOSIS — I89.0 LYMPHEDEMA OF LEFT LEG: ICD-10-CM

## 2019-09-09 PROCEDURE — 97165 OT EVAL LOW COMPLEX 30 MIN: CPT

## 2019-09-09 NOTE — PROGRESS NOTES
OCCUPATIONAL THERAPY LE LYMPHEDEMA EVALUATION:   Referring Physician: Dr. Fabian Marquez  Diagnosis:  Lymphedema of left leg (I89.0)  Date of Service: 9/9/2019     PATIENT SUMMARY   Gini Chau is a 80year old y/o female who presents : sudden onset of sharlene (+15.9cm)  And still needs compression. * Pt is not as active/mobile as her eyesight is decreasing therefore unable to move lymph fluid in legs via exercise.   * L LE fluid volume is 18.3cm greater today than when Pt completed lymphedema tx last March 2019 sessions  6-  Reduce L LE lymphedema volume by 12cm to allow pt to improve standing and walking balance. 10 sessions  7- Reduce L LE lymphedema density to soft and mobile to reduce infection risk.  12 sessions  8- review proper compression level for pt , co proximal to 1st toe web space  22.8 21.2   5cm proximal to 1st toe web space  20.4 23.0   TOTALS  321.5cm (+15.9cm) 305.6cm   Stemmer's sign  no no

## 2019-09-11 ENCOUNTER — HOSPITAL ENCOUNTER (OUTPATIENT)
Dept: OCCUPATIONAL MEDICINE | Facility: HOSPITAL | Age: 84
Setting detail: THERAPIES SERIES
Discharge: HOME | End: 2019-09-11
Attending: INTERNAL MEDICINE
Payer: MEDICARE

## 2019-09-11 PROCEDURE — 97140 MANUAL THERAPY 1/> REGIONS: CPT

## 2019-09-11 NOTE — PROGRESS NOTES
Dx: Lymphedema of left leg (I89.0)         Authorized # of Visits:  2/12        Next MD visit/plan renewal:  None scheduled  Fall Risk: Standard          Precautions:  Lymphedema;        Subjective:   Pt reports:   Bilateral legs feel good and remain w/o sw Charges: 4x MT   Total Timed Treatment: 55 min  Total Treatment Time: 55 min

## 2019-09-19 ENCOUNTER — HOSPITAL ENCOUNTER (OUTPATIENT)
Dept: OCCUPATIONAL MEDICINE | Facility: HOSPITAL | Age: 84
Setting detail: THERAPIES SERIES
Discharge: HOME | End: 2019-09-19
Attending: INTERNAL MEDICINE
Payer: MEDICARE

## 2019-09-19 PROCEDURE — 97140 MANUAL THERAPY 1/> REGIONS: CPT

## 2019-09-19 NOTE — PROGRESS NOTES
Dx: Lymphedema of left leg (I89.0)         Authorized # of Visits:  3/12        Next MD visit/plan renewal:  None scheduled  Fall Risk: Standard          Precautions:  Lymphedema;        Subjective:   Pt reports:   Bilateral legs feel good and remain w/o sw balance. 10 sessions  7- Reduce L LE lymphedema density to soft and mobile to reduce infection risk.  12 sessions  8- review proper compression level for pt , consider lighter compression knee high to ease donning process and reduce stress on Pt /husbands h

## 2019-09-23 ENCOUNTER — APPOINTMENT (OUTPATIENT)
Dept: OCCUPATIONAL MEDICINE | Facility: HOSPITAL | Age: 84
End: 2019-09-23
Payer: MEDICARE

## 2019-09-26 ENCOUNTER — TELEPHONE (OUTPATIENT)
Dept: OCCUPATIONAL MEDICINE | Facility: HOSPITAL | Age: 84
End: 2019-09-26

## 2019-09-27 ENCOUNTER — APPOINTMENT (OUTPATIENT)
Dept: OCCUPATIONAL MEDICINE | Facility: HOSPITAL | Age: 84
End: 2019-09-27
Attending: INTERNAL MEDICINE
Payer: MEDICARE

## 2019-10-01 ENCOUNTER — TELEPHONE (OUTPATIENT)
Dept: OCCUPATIONAL MEDICINE | Facility: HOSPITAL | Age: 84
End: 2019-10-01

## 2019-10-03 ENCOUNTER — HOSPITAL ENCOUNTER (OUTPATIENT)
Dept: OCCUPATIONAL MEDICINE | Facility: HOSPITAL | Age: 84
Setting detail: THERAPIES SERIES
Discharge: HOME | End: 2019-10-03
Attending: INTERNAL MEDICINE
Payer: MEDICARE

## 2019-10-03 PROCEDURE — 97535 SELF CARE MNGMENT TRAINING: CPT

## 2019-10-03 PROCEDURE — 97140 MANUAL THERAPY 1/> REGIONS: CPT

## 2019-10-03 NOTE — PROGRESS NOTES
Dx: Lymphedema of left leg (I89.0)         Authorized # of Visits:  4/12        Next MD visit/plan renewal:  None scheduled  Fall Risk: Standard          Precautions:  Lymphedema;        Subjective:   Pt reports:   Bilateral legs feel good and remain w/o sw balance. 10 sessions  7- Reduce L LE lymphedema density to soft and mobile to reduce infection risk.  12 sessions  8- review proper compression level for pt , consider lighter compression knee high to ease donning process and reduce stress on Pt /husbands h

## 2019-10-13 ENCOUNTER — APPOINTMENT (OUTPATIENT)
Dept: CT IMAGING | Facility: HOSPITAL | Age: 84
End: 2019-10-13
Attending: EMERGENCY MEDICINE
Payer: MEDICARE

## 2019-10-13 ENCOUNTER — APPOINTMENT (OUTPATIENT)
Dept: GENERAL RADIOLOGY | Facility: HOSPITAL | Age: 84
End: 2019-10-13
Attending: EMERGENCY MEDICINE
Payer: MEDICARE

## 2019-10-13 ENCOUNTER — HOSPITAL ENCOUNTER (EMERGENCY)
Facility: HOSPITAL | Age: 84
Discharge: HOME | End: 2019-10-13
Attending: EMERGENCY MEDICINE
Payer: MEDICARE

## 2019-10-13 VITALS
DIASTOLIC BLOOD PRESSURE: 99 MMHG | TEMPERATURE: 98 F | SYSTOLIC BLOOD PRESSURE: 182 MMHG | RESPIRATION RATE: 18 BRPM | WEIGHT: 171 LBS | OXYGEN SATURATION: 95 % | BODY MASS INDEX: 29.19 KG/M2 | HEIGHT: 64 IN | HEART RATE: 110 BPM

## 2019-10-13 DIAGNOSIS — S00.93XA CONTUSION OF HEAD, UNSPECIFIED PART OF HEAD, INITIAL ENCOUNTER: Primary | ICD-10-CM

## 2019-10-13 DIAGNOSIS — M54.9 BACK PAIN WITH RADIATION: ICD-10-CM

## 2019-10-13 PROCEDURE — 99284 EMERGENCY DEPT VISIT MOD MDM: CPT

## 2019-10-13 PROCEDURE — 70450 CT HEAD/BRAIN W/O DYE: CPT | Performed by: EMERGENCY MEDICINE

## 2019-10-13 PROCEDURE — 72100 X-RAY EXAM L-S SPINE 2/3 VWS: CPT | Performed by: EMERGENCY MEDICINE

## 2019-10-13 RX ORDER — ACETAMINOPHEN 500 MG
1000 TABLET ORAL ONCE
Status: COMPLETED | OUTPATIENT
Start: 2019-10-13 | End: 2019-10-13

## 2019-10-13 NOTE — ED PROVIDER NOTES
Patient Seen in: [facility] Emergency Department      History   Patient presents with:  Fall (musculoskeletal, neurologic)    Stated Complaint: fall, back pain    HPI    Patient here for further evaluation after mechanical fall.   She is walking on h Lumbar degenerative disc disease 12/27/2011   • Lumbar spinal stenosis 12/27/2011   • Macular hole    • Miscarriage 1959   • Osteoarthritis, multiple sites 12/27/2011   • Osteoarthrosis, unspecified whether generalized or localized, unspecified site 6/29/2 appearing  Head: Normocephalic , developed a contusion in the back of her head otherwise atraumatic. No signs of skull fracture no Garcia's no raccoons, normal TMs, no broken skin. No tenderness of the facial bones.     Nose: Nose normal.   Eyes: EOM are she has no midline tenderness. Will discharge home. Tylenol for pain. Close follow-up with PCP if symptoms worsen.               Disposition and Plan     Clinical Impression:  Contusion of head, unspecified part of head, initial encounter  (primary encou

## 2019-10-13 NOTE — ED INITIAL ASSESSMENT (HPI)
Pt brought in for fall this am. Pt lost balance and sat down really hard on cement slab. Pt then hit back of head. Pt denies loc. Pt c/o lower back pain. Pt denies n,v,d or any numbness or tingling.

## 2019-10-21 ENCOUNTER — OFFICE VISIT (OUTPATIENT)
Dept: INTERNAL MEDICINE CLINIC | Facility: CLINIC | Age: 84
End: 2019-10-21
Payer: MEDICARE

## 2019-10-21 VITALS
HEIGHT: 64 IN | BODY MASS INDEX: 29.74 KG/M2 | SYSTOLIC BLOOD PRESSURE: 164 MMHG | TEMPERATURE: 98 F | WEIGHT: 174.19 LBS | DIASTOLIC BLOOD PRESSURE: 88 MMHG | RESPIRATION RATE: 12 BRPM | HEART RATE: 80 BPM

## 2019-10-21 DIAGNOSIS — Z23 NEED FOR VACCINATION: ICD-10-CM
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
## 2019-10-21 DIAGNOSIS — I10 ESSENTIAL HYPERTENSION: ICD-10-CM

## 2019-10-21 PROCEDURE — 90662 IIV NO PRSV INCREASED AG IM: CPT | Performed by: INTERNAL MEDICINE

## 2019-10-21 PROCEDURE — G0008 ADMIN INFLUENZA VIRUS VAC: HCPCS | Performed by: INTERNAL MEDICINE

## 2019-10-21 PROCEDURE — 99214 OFFICE O/P EST MOD 30 MIN: CPT | Performed by: INTERNAL MEDICINE

## 2019-10-21 NOTE — PROGRESS NOTES
[address] [facility]    CHIEF COMPLAINT:  Patient presents with:  ER F/U: no pain but feels shakey. Thinks it's due to vision issues. Frustrated with lack of prognosis for future vision issues. Imm/Inj: requests flu shot.          HISTORY OF PRESENT Belem Crawley performed in visit on [birth year]   COMP METABOLIC PANEL (14)   Result Value Ref Range    Glucose 94 70 - 99 mg/dL    Sodium 140 136 - 145 mmol/L    Potassium 3.9 3.5 - 5.1 mmol/L    Chloride 106 98 - 107 mmol/L    CO2 28.0 21.0 - 32.0 mmol/L    Anion Gap 6 0

## 2019-11-06 ENCOUNTER — OFFICE VISIT (OUTPATIENT)
Dept: INTERNAL MEDICINE CLINIC | Facility: CLINIC | Age: 84
End: 2019-11-06
Payer: MEDICARE

## 2019-11-06 VITALS
SYSTOLIC BLOOD PRESSURE: 150 MMHG | DIASTOLIC BLOOD PRESSURE: 80 MMHG | TEMPERATURE: 98 F | BODY MASS INDEX: 29.74 KG/M2 | HEART RATE: 76 BPM | RESPIRATION RATE: 16 BRPM | HEIGHT: 64 IN | WEIGHT: 174.19 LBS

## 2019-11-06 DIAGNOSIS — R94.01 ABNORMAL EEG: ICD-10-CM

## 2019-11-06 DIAGNOSIS — Z00.00 ENCOUNTER FOR ANNUAL HEALTH EXAMINATION: Primary | ICD-10-CM

## 2019-11-06 DIAGNOSIS — I10 ESSENTIAL HYPERTENSION: ICD-10-CM

## 2019-11-06 DIAGNOSIS — I89.0 LYMPHEDEMA OF LEFT LEG: ICD-10-CM

## 2019-11-06 PROCEDURE — G0439 PPPS, SUBSEQ VISIT: HCPCS | Performed by: INTERNAL MEDICINE

## 2019-11-06 PROCEDURE — 99214 OFFICE O/P EST MOD 30 MIN: CPT | Performed by: INTERNAL MEDICINE

## 2019-11-06 RX ORDER — ENALAPRIL MALEATE 2.5 MG/1
2.5 TABLET ORAL DAILY
Qty: 30 TABLET | Refills: 1 | Status: SHIPPED | OUTPATIENT
Start: 2019-11-06 | End: 2019-12-03

## 2019-11-06 NOTE — PROGRESS NOTES
HPI:   Villa Cosby is a 80year old female who presents for a med check and Medicare Subsequent Annual Wellness visit (Pt already had Initial Annual Wellness). Htn: diet controlled.  Off meds at this time but bp elevated the past couple of times 1-Yes  Fall/Risk Scorin      Cognitive Assessment   She had a completely normal cognitive assessment- see flowsheet entries    Functional Ability/Status   Morteza Weston has some abnormal functions as listed below:  She has Dressing and/or Bathin currently take aspirin. CAGE Alcohol screening   Mikey Mccall was screened for Alcohol abuse and had a score of 0 so is at low risk.     Patient Care Team: Patient Care Team:  Saleem Norman MD as PCP - General  Saleem Norman MD as PCP - [facility] [celecoxib]; food; hydromorphone; latex; novocain; nsaids; patanase; singulair; and wool. CURRENT MEDICATIONS:   Enalapril Maleate 2.5 MG Oral Tab, Take 1 tablet (2.5 mg total) by mouth daily.   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     She  has a past surgical history that includes other surgical hi 11/12/2014   • FLU VACC High Dose 65 YRS & Older PRSV Free (82238) 12/15/2015, 01/09/2017, 10/21/2019   • FLUAD High Dose 72 yr and older (35765) 09/20/2017, 10/16/2018   • Influenza 10/20/2008, 10/22/2010, 10/22/2010   • Pneumococcal (Prevnar 13) 09/04/20 patient  PREVENTATIVE SERVICES  INDICATIONS AND SCHEDULE Internal Lab or Procedure External Lab or Procedure   Diabetes Screening      HbgA1C   Annually No results found for: A1C No flowsheet data found.     Fasting Blood Sugar (FSB)Annually Glucose (mg/dL) once after your 65th birthday    Pneumococcal 23 (Pneumovax)  Covered Once after 65 09/04/2005 Please get once after your 65th birthday    Hepatitis B for Moderate/High Risk No vaccine history found Medium/high risk factors:   End-stage renal disease   Hem

## 2019-11-06 NOTE — PATIENT INSTRUCTIONS
Shayla Gupta's SCREENING SCHEDULE   Tests on this list are recommended by your physician but may not be covered, or covered at this frequency, by your insurer. Please check with your insurance carrier before scheduling to verify coverage.    Anastacio Luu old and have smoked more than 100 cigarettes in their lifetime   • Anyone with a family history    Colorectal Cancer Screening  Covered up to Age 76     Colonoscopy Screen   Covered every 10 years- more often if abnormal There are no preventive care remind Orders placed or performed in visit on 10/21/19   • FLU VACC HIGH DOSE PRSV FREE    Please get every year    Pneumococcal 13 (Prevnar)  Covered Once after 65 Orders placed or performed in visit on 09/04/15   • PNEUMOCOCCAL VACC, 13 SONALI IM    Please get onc

## 2019-12-02 ENCOUNTER — APPOINTMENT (OUTPATIENT)
Dept: LAB | Age: 84
End: 2019-12-02
Attending: INTERNAL MEDICINE
Payer: MEDICARE

## 2019-12-02 DIAGNOSIS — I10 ESSENTIAL HYPERTENSION: ICD-10-CM

## 2019-12-02 PROCEDURE — 36415 COLL VENOUS BLD VENIPUNCTURE: CPT

## 2019-12-02 PROCEDURE — 80053 COMPREHEN METABOLIC PANEL: CPT

## 2019-12-03 ENCOUNTER — OFFICE VISIT (OUTPATIENT)
Dept: INTERNAL MEDICINE CLINIC | Facility: CLINIC | Age: 84
End: 2019-12-03
Payer: MEDICARE

## 2019-12-03 VITALS
WEIGHT: 174.19 LBS | DIASTOLIC BLOOD PRESSURE: 60 MMHG | TEMPERATURE: 98 F | SYSTOLIC BLOOD PRESSURE: 124 MMHG | BODY MASS INDEX: 29.74 KG/M2 | HEART RATE: 78 BPM | RESPIRATION RATE: 16 BRPM | HEIGHT: 64 IN

## 2019-12-03 DIAGNOSIS — I10 ESSENTIAL HYPERTENSION: ICD-10-CM

## 2019-12-03 DIAGNOSIS — R94.01 ABNORMAL EEG: ICD-10-CM

## 2019-12-03 PROCEDURE — 99213 OFFICE O/P EST LOW 20 MIN: CPT | Performed by: INTERNAL MEDICINE

## 2019-12-03 RX ORDER — ENALAPRIL MALEATE 2.5 MG/1
2.5 TABLET ORAL DAILY
Qty: 90 TABLET | Refills: 1 | Status: SHIPPED | OUTPATIENT
Start: 2019-12-03 | End: 2020-09-02

## 2019-12-03 NOTE — PROGRESS NOTES
[facility]    CHIEF COMPLAINT:  Patient presents with:  Blood Pressure        HISTORY OF PRESENT ILLNESS:  Here for bp check. Now on enalapril. Doing better. bp at goal today. Tolerating it well. No chest pain, no shortness of breath.      Has n - 2.0 mg/dL    Total Protein 7.2 6.4 - 8.2 g/dL    Albumin 3.8 3.4 - 5.0 g/dL    Globulin  3.4 2.8 - 4.4 g/dL    A/G Ratio 1.1 1.0 - 2.0    FASTING Yes             ASSESSMENT AND PLAN:  1.  Essential hypertension  Continue enalapril.   - Enalapril Maleate 2

## 2020-04-28 ENCOUNTER — V-VISIT (OUTPATIENT)
Dept: CARDIOLOGY | Age: 85
End: 2020-04-28

## 2020-04-28 DIAGNOSIS — I34.1 NONRHEUMATIC MITRAL VALVE PROLAPSE: ICD-10-CM

## 2020-04-28 DIAGNOSIS — I49.3 VENTRICULAR PREMATURE DEPOLARIZATION: ICD-10-CM

## 2020-04-28 DIAGNOSIS — I25.10 CORONARY ARTERY DISEASE INVOLVING NATIVE CORONARY ARTERY OF NATIVE HEART WITHOUT ANGINA PECTORIS: Primary | ICD-10-CM

## 2020-04-28 DIAGNOSIS — I89.0 LYMPHEDEMA: ICD-10-CM

## 2020-04-28 DIAGNOSIS — E78.00 PURE HYPERCHOLESTEROLEMIA: ICD-10-CM

## 2020-04-28 DIAGNOSIS — I10 ESSENTIAL HYPERTENSION: ICD-10-CM

## 2020-04-28 PROCEDURE — 99442 TELEPHONE E&M BY PHYSICIAN EST PT NOT ORIG PREV 7 DAYS 11-20 MIN: CPT | Performed by: INTERNAL MEDICINE

## 2020-04-28 RX ORDER — ENALAPRIL MALEATE 2.5 MG/1
2.5 TABLET ORAL
COMMUNITY
Start: 2019-12-03

## 2020-04-28 RX ORDER — DORZOLAMIDE HCL 20 MG/ML
1 SOLUTION/ DROPS OPHTHALMIC
COMMUNITY
Start: 2020-03-05

## 2020-04-28 ASSESSMENT — ENCOUNTER SYMPTOMS
FEVER: 0
ALLERGIC/IMMUNOLOGIC COMMENTS: NO NEW FOOD ALLERGIES
SUSPICIOUS LESIONS: 0
CHILLS: 0
WEIGHT LOSS: 0
HEMATOCHEZIA: 0
HEMOPTYSIS: 0
BRUISES/BLEEDS EASILY: 0
COUGH: 0
WEIGHT GAIN: 0

## 2020-04-28 ASSESSMENT — PATIENT HEALTH QUESTIONNAIRE - PHQ9
1. LITTLE INTEREST OR PLEASURE IN DOING THINGS: NOT AT ALL
SUM OF ALL RESPONSES TO PHQ9 QUESTIONS 1 AND 2: 0
2. FEELING DOWN, DEPRESSED OR HOPELESS: NOT AT ALL
SUM OF ALL RESPONSES TO PHQ9 QUESTIONS 1 AND 2: 0

## 2020-05-11 ENCOUNTER — APPOINTMENT (OUTPATIENT)
Dept: CARDIOLOGY | Age: 85
End: 2020-05-11

## 2020-06-08 ENCOUNTER — TELEPHONE (OUTPATIENT)
Dept: CARDIOLOGY | Age: 85
End: 2020-06-08

## 2020-08-18 ENCOUNTER — TELEPHONE (OUTPATIENT)
Dept: INTERNAL MEDICINE CLINIC | Facility: CLINIC | Age: 85
End: 2020-08-18

## 2020-08-18 DIAGNOSIS — I89.0 LYMPHEDEMA: Primary | ICD-10-CM

## 2020-08-18 NOTE — TELEPHONE ENCOUNTER
Pt calling and said that her daughter called the Lymphedema clinic at [facility] to get pt an appt and they said that pt needs a referral and they are not taking new pts until September,    Please Advise     Thank you

## 2020-08-18 NOTE — TELEPHONE ENCOUNTER
Pt was due for med check in June, please have her schedule appt. We can do referral then or before if it's far out. Thanks!

## 2020-08-19 NOTE — TELEPHONE ENCOUNTER
For pt to schedule an appt, needs referral now. Otherwise treatment delayed.  Ok to place place referral?

## 2020-08-27 ENCOUNTER — HOSPITAL ENCOUNTER (EMERGENCY)
Facility: HOSPITAL | Age: 85
Discharge: HOME | End: 2020-08-27
Attending: EMERGENCY MEDICINE
Payer: MEDICARE

## 2020-08-27 ENCOUNTER — APPOINTMENT (OUTPATIENT)
Dept: GENERAL RADIOLOGY | Facility: HOSPITAL | Age: 85
End: 2020-08-27
Attending: EMERGENCY MEDICINE
Payer: MEDICARE

## 2020-08-27 ENCOUNTER — APPOINTMENT (OUTPATIENT)
Dept: CT IMAGING | Facility: HOSPITAL | Age: 85
End: 2020-08-27
Attending: EMERGENCY MEDICINE
Payer: MEDICARE

## 2020-08-27 VITALS
HEIGHT: 64 IN | BODY MASS INDEX: 30.39 KG/M2 | RESPIRATION RATE: 20 BRPM | SYSTOLIC BLOOD PRESSURE: 181 MMHG | DIASTOLIC BLOOD PRESSURE: 89 MMHG | HEART RATE: 113 BPM | TEMPERATURE: 97 F | WEIGHT: 178 LBS | OXYGEN SATURATION: 97 %
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
## 2020-08-27 PROCEDURE — 72125 CT NECK SPINE W/O DYE: CPT | Performed by: EMERGENCY MEDICINE

## 2020-08-27 PROCEDURE — 72100 X-RAY EXAM L-S SPINE 2/3 VWS: CPT | Performed by: EMERGENCY MEDICINE

## 2020-08-27 PROCEDURE — 72072 X-RAY EXAM THORAC SPINE 3VWS: CPT | Performed by: EMERGENCY MEDICINE

## 2020-08-27 PROCEDURE — 99284 EMERGENCY DEPT VISIT MOD MDM: CPT

## 2020-08-27 RX ORDER — ACETAMINOPHEN 500 MG
1000 TABLET ORAL ONCE
Status: COMPLETED | OUTPATIENT
Start: 2020-08-27 | End: 2020-08-27

## 2020-08-27 NOTE — ED NOTES
MD at bedside to give patient results. Pt contiomnues to state a lot of pain. P:t refuses any narcotics/   Will get patient up with walker.

## 2020-08-27 NOTE — ED INITIAL ASSESSMENT (HPI)
89YF c/c of fall Pt state that she slid out her chair tonight and was unable to get back up.  Pt state she having increased middle back that radiates down to her legs Pt state that the pain is worse then normal tonight due to the fall

## 2020-08-28 ENCOUNTER — TELEPHONE (OUTPATIENT)
Dept: INTERNAL MEDICINE CLINIC | Facility: CLINIC | Age: 85
End: 2020-08-28

## 2020-08-28 NOTE — TELEPHONE ENCOUNTER
Was reviewing ER report, patient was on the list, asked to do a follow up, no given date. Noticed patient has a upcoming apt with you 9/2/2020 for med check/referral, FYI if you want  to change visit to ER follow up? Please advise, thank you.

## 2020-08-29 NOTE — ED PROVIDER NOTES
Patient Seen in: [facility] Emergency Department      History   Patient presents with:  Fall    Stated Complaint: fall    HPI    55-year-old patient presents to the emergency department. She has taken an ambulance here.   Patient lives alone at home 6/14/2012   • DJD (degenerative joint disease)     hip, knee   • DJD (degenerative joint disease)     cervical spine   • Dysphagia    • Dyspnea    • Dyspnea on exertion 6/6/2012   • Endometriosis    • Esophageal reflux 6/29/2012   • Gallbladder disorder 3/ for stated complaint: fall  Other systems are as noted in HPI. Constitutional and vital signs reviewed. All other systems reviewed and negative except as noted above.     Physical Exam     ED Triage Vitals [08/27/20 0315]   BP (!) 176/78   Pulse 108 and lumbar back basically everywhere that I touch with some paraspinal discomfort as well. The pain is not severe patient has more pain with getting into position so I can examine her back then with me actually touching her back.    Lymphadenopathy:      C through the lung apices demonstrate biapical pleural     parenchymal scarring.                                    =====    CONCLUSION:      1. Marked degenerative change.          ED M.D. notified of these findings with preliminary radiology report from Preliminary reading provided by radiologist from [address] [place] [address]     Radiology. Thoracic scoliosis concave right upper, concave left mid-lower     thoracic regions on the frontal view.   Multilevel advanced degenerative     disc disease with multilevel advanced

## 2020-09-02 ENCOUNTER — TELEPHONE (OUTPATIENT)
Dept: CARDIOLOGY | Age: 85
End: 2020-09-02

## 2020-09-02 ENCOUNTER — OFFICE VISIT (OUTPATIENT)
Dept: INTERNAL MEDICINE CLINIC | Facility: CLINIC | Age: 85
End: 2020-09-02
Payer: MEDICARE

## 2020-09-02 VITALS
RESPIRATION RATE: 16 BRPM | DIASTOLIC BLOOD PRESSURE: 80 MMHG | TEMPERATURE: 98 F | BODY MASS INDEX: 31.6 KG/M2 | HEART RATE: 90 BPM | SYSTOLIC BLOOD PRESSURE: 164 MMHG | WEIGHT: 185.13 LBS | HEIGHT: 64 IN

## 2020-09-02 DIAGNOSIS — I10 ESSENTIAL HYPERTENSION: ICD-10-CM

## 2020-09-02 DIAGNOSIS — I25.10 CORONARY ARTERY DISEASE INVOLVING NATIVE CORONARY ARTERY OF NATIVE HEART WITHOUT ANGINA PECTORIS: Primary | ICD-10-CM

## 2020-09-02 DIAGNOSIS — R07.89 CHEST HEAVINESS: ICD-10-CM
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
## 2020-09-02 DIAGNOSIS — R94.01 ABNORMAL EEG: ICD-10-CM

## 2020-09-02 PROCEDURE — 99214 OFFICE O/P EST MOD 30 MIN: CPT | Performed by: INTERNAL MEDICINE

## 2020-09-02 PROCEDURE — 93000 ELECTROCARDIOGRAM COMPLETE: CPT | Performed by: INTERNAL MEDICINE

## 2020-09-02 RX ORDER — ENALAPRIL MALEATE 5 MG/1
5 TABLET ORAL DAILY
Qty: 90 TABLET | Refills: 0 | Status: SHIPPED | OUTPATIENT
Start: 2020-09-02 | End: 2020-12-08 | Stop reason: DRUGHIGH

## 2020-09-02 NOTE — PROGRESS NOTES
[facility]    CHIEF COMPLAINT:  Patient presents with:  ER F/U  Medication Follow-Up        HISTORY OF PRESENT ILLNESS:  Here for follow up and med check. Htn: Taking meds regularly. No chest pain, no shortness of breath.      She fell about a w 136 - 145 mmol/L    Potassium 4.3 3.5 - 5.1 mmol/L    Chloride 108 98 - 112 mmol/L    CO2 30.0 21.0 - 32.0 mmol/L    Anion Gap 3 0 - 18 mmol/L    BUN 22 (H) 7 - 18 mg/dL    Creatinine 1.09 (H) 0.55 - 1.02 mg/dL    BUN/CREA Ratio 20.2 (H) 10.0 - 20.0    Hayes

## 2020-10-05 ENCOUNTER — OFFICE VISIT (OUTPATIENT)
Dept: OCCUPATIONAL MEDICINE | Facility: HOSPITAL | Age: 85
End: 2020-10-05
Attending: INTERNAL MEDICINE
Payer: MEDICARE

## 2020-10-05 DIAGNOSIS — I89.0 LYMPHEDEMA: ICD-10-CM

## 2020-10-05 PROCEDURE — 97166 OT EVAL MOD COMPLEX 45 MIN: CPT

## 2020-10-05 NOTE — PROGRESS NOTES
JOSE LYMPHEDEMA EVALUATION:   Referring Physician: Dr. Suzanne Larios  Diagnosis:   Lymphedema (I89.0)  Date of Service: 10/5/2020     PATIENT SUMMARY   Dat Perez is a 80year old female who presents to therapy today:   Lingering/worsening bilateral leg an reduce swelling and decrease risk of infection, ROM, garment prescription, compression device prescription and education/self management. OBJECTIVE:     Edema/Tissue Observations:   Bilat medial knees to upper 1/3 of lower leg: densely boggy.  Poor tissu evaluation involved High Complexity decision making due to 3+ personal factors/comorbidities, 3 body structures involved/activity limitations, and unstable symptoms including progressing edema legs/feet, weakness.     PLAN OF CARE:    Goals:  (to be met in Dept: [phone]    Sincerely,  Electronically signed by therapist: Sandy Sethi OTR/L  CLT  [de-identified] certification required: Yes  I certify the need for these services furnished under this plan of treatment and while under my care.     X_________

## 2020-10-07 ENCOUNTER — OFFICE VISIT (OUTPATIENT)
Dept: OCCUPATIONAL MEDICINE | Facility: HOSPITAL | Age: 85
End: 2020-10-07
Attending: INTERNAL MEDICINE
Payer: MEDICARE

## 2020-10-07 PROCEDURE — 97140 MANUAL THERAPY 1/> REGIONS: CPT

## 2020-10-07 NOTE — PROGRESS NOTES
Dx:  Lymphedema (I89.0)       Insurance (Authorized # of Visits): 2/21     Next MD/Plan Renewal Date:   Authorizing Physician: Dr. Wilber Ponce   Fall Risk:    high   Precautions:  60+ yr/o, HTN, DJD,    Subjective:   Pt Reports:  * No pain or change in tissue not bandaging for 20-23 hours. 2 sessions  * Pt/Caregiver will be independent in  LE short stretch compression bandaging. sessions  DEFERRED for now.    6- Reduce L LE lymphedema volume by 15cm and improve tissue quality to soft/mobile to allow pt to safely amb

## 2020-10-12 ENCOUNTER — OFFICE VISIT (OUTPATIENT)
Dept: OCCUPATIONAL MEDICINE | Facility: HOSPITAL | Age: 85
End: 2020-10-12
Attending: INTERNAL MEDICINE
Payer: MEDICARE

## 2020-10-12 PROCEDURE — 97140 MANUAL THERAPY 1/> REGIONS: CPT

## 2020-10-12 NOTE — PROGRESS NOTES
Dx:  Lymphedema (I89.0)       Insurance (Authorized # of Visits): 3/21     Next MD/Plan Renewal Date:   Authorizing Physician: Dr. Bridger Mccoy   Fall Risk:    high   Precautions:  60+ yr/o, HTN, DJD,    Subjective:   Pt Reports:  *legs feel softer and less swolle 3 layer bandages. Family is trying. Goals:  (to be met in 21 visits)  1- Pt/family will be independent in daily skin care. 2 sessions  2- Pt will tolerate bilat modified compression via Tensogrip sleeve for 18 hours.  2 sessions  3-  Pt will be independ

## 2020-10-14 ENCOUNTER — OFFICE VISIT (OUTPATIENT)
Dept: OCCUPATIONAL MEDICINE | Facility: HOSPITAL | Age: 85
End: 2020-10-14
Attending: INTERNAL MEDICINE
Payer: MEDICARE

## 2020-10-14 PROCEDURE — 97140 MANUAL THERAPY 1/> REGIONS: CPT

## 2020-10-14 NOTE — PROGRESS NOTES
Dx:  Lymphedema (I89.0)       Insurance (Authorized # of Visits): 4/21     Next MD/Plan Renewal Date: 1/3/2021   Authorizing Physician: Dr. Geneva Veliz   Fall Risk:    high   Precautions:  60+ yr/o, HTN, DJD,    Subjective:   Pt Reports:  *legs still look and fee sessions   4- Pt will be independent in self-manual lymph drainage. deferred  5- Pt will tolerate  Bilat LE short stretch compression bandaging for 20-23 hours. 2 sessions  * Pt/Caregiver will be independent in  LE short stretch compression bandaging.  sess

## 2020-10-19 ENCOUNTER — OFFICE VISIT (OUTPATIENT)
Dept: OCCUPATIONAL MEDICINE | Facility: HOSPITAL | Age: 85
End: 2020-10-19
Attending: INTERNAL MEDICINE
Payer: MEDICARE

## 2020-10-19 PROCEDURE — 97140 MANUAL THERAPY 1/> REGIONS: CPT

## 2020-10-20 NOTE — PROGRESS NOTES
Dx:  Lymphedema (I89.0)       Insurance (Authorized # of Visits): 5/21     Next MD/Plan Renewal Date: 1/3/2021   Authorizing Physician: Dr. Magalie Ward   Fall Risk:    high   Precautions:  60+ yr/o, HTN, DJD,    Subjective:   Pt Reports:  *legs still look and fee status is improving as fluid volume is decreasing in legs and new shoes provide good support and no foot pain. * responds well and quickly to 3 layer bandage system though even w/o wearing it for the last week , bilat  Leg  fluid reduction is maintained.

## 2020-10-21 ENCOUNTER — OFFICE VISIT (OUTPATIENT)
Dept: OCCUPATIONAL MEDICINE | Facility: HOSPITAL | Age: 85
End: 2020-10-21
Attending: INTERNAL MEDICINE
Payer: MEDICARE

## 2020-10-21 PROCEDURE — 97140 MANUAL THERAPY 1/> REGIONS: CPT

## 2020-10-22 NOTE — PROGRESS NOTES
Dx:  Lymphedema (I89.0)       Insurance (Authorized # of Visits): 6/21     Next MD/Plan Renewal Date: 1/3/2021   Authorizing Physician: Dr. Tawana Collins   Fall Risk:    high   Precautions:  60+ yr/o, HTN, DJD,    Subjective:   Pt Reports:  *I must have thrown the new shoes provide good support and no foot pain. * responds well and quickly to 3 layer bandage system though even w/o wearing it for the last week , bilat  Leg  fluid reduction is maintained.   * noticed improved mentation today, less confusion from 2 day

## 2020-10-23 ENCOUNTER — LAB ENCOUNTER (OUTPATIENT)
Dept: LAB | Age: 85
End: 2020-10-23
Attending: INTERNAL MEDICINE
Payer: MEDICARE

## 2020-10-23 DIAGNOSIS — I10 ESSENTIAL HYPERTENSION: ICD-10-CM

## 2020-10-23 PROCEDURE — 80053 COMPREHEN METABOLIC PANEL: CPT

## 2020-10-23 PROCEDURE — 80061 LIPID PANEL: CPT

## 2020-10-23 PROCEDURE — 36415 COLL VENOUS BLD VENIPUNCTURE: CPT

## 2020-10-26 ENCOUNTER — OFFICE VISIT (OUTPATIENT)
Dept: OCCUPATIONAL MEDICINE | Facility: HOSPITAL | Age: 85
End: 2020-10-26
Attending: INTERNAL MEDICINE
Payer: MEDICARE

## 2020-10-26 PROCEDURE — 97140 MANUAL THERAPY 1/> REGIONS: CPT

## 2020-10-26 NOTE — PROGRESS NOTES
Dx:  Lymphedema (I89.0)       Insurance (Authorized # of Visits): 7/21     Next MD/Plan Renewal Date: 1/3/2021   Authorizing Physician: Dr. Fabian Marquez   Fall Risk:    high   Precautions:  60+ yr/o, HTN, DJD,    Subjective:   Pt Reports:  * I wear my bandages the well and quickly to 3 layer bandage system though even w/o wearing it for past 2 days per Pt. Bilat  Leg  fluid reduction is maintained.   * Pt states she is ready to purchase velcro compression leg/foot garments IF she is allowed to return them if they are

## 2020-10-28 ENCOUNTER — OFFICE VISIT (OUTPATIENT)
Dept: OCCUPATIONAL MEDICINE | Facility: HOSPITAL | Age: 85
End: 2020-10-28
Attending: INTERNAL MEDICINE
Payer: MEDICARE

## 2020-10-28 NOTE — PROGRESS NOTES
Dx:  Lymphedema (I89.0)       Insurance (Authorized # of Visits): 8/21     Next MD/Plan Renewal Date: 1/3/2021   Authorizing Physician: Dr. Magalie Ward   Fall Risk:    high   Precautions:  60+ yr/o, HTN, DJD,  Subjective:   Pt Reports:  * I wear my bandages the w feet.  Application of compression     COMPRESSION:  *  Therapist applied  3 layer bandages to bilat lower legs and feet. * Pt owns 20-30  MmHg compression , Juzo soft , size 4   Thigh highs.  (not ready to return to wearing again at this point.)  * Mistake and to reduce pt's infection risk. 10 sessions  7- Begin MLD and compression on R LE once goals are met on L. ONGOING.  Deferred until 2021.  8  Pt will be independent in use of compression garments, self-manual lymph drainage, decongestive exercises and ly

## 2020-11-04 ENCOUNTER — APPOINTMENT (OUTPATIENT)
Dept: CT IMAGING | Facility: HOSPITAL | Age: 85
End: 2020-11-04
Attending: EMERGENCY MEDICINE
Payer: MEDICARE

## 2020-11-04 ENCOUNTER — HOSPITAL ENCOUNTER (EMERGENCY)
Facility: HOSPITAL | Age: 85
Discharge: HOME | End: 2020-11-05
Attending: EMERGENCY MEDICINE
Payer: MEDICARE

## 2020-11-04 VITALS
RESPIRATION RATE: 16 BRPM | WEIGHT: 180 LBS | HEART RATE: 110 BPM | OXYGEN SATURATION: 96 % | SYSTOLIC BLOOD PRESSURE: 189 MMHG | DIASTOLIC BLOOD PRESSURE: 78 MMHG | TEMPERATURE: 98 F | BODY MASS INDEX: 31 KG/M2

## 2020-11-04 DIAGNOSIS — S09.90XA INJURY OF HEAD, INITIAL ENCOUNTER: Primary | ICD-10-CM

## 2020-11-04 PROCEDURE — 99284 EMERGENCY DEPT VISIT MOD MDM: CPT

## 2020-11-04 PROCEDURE — 70450 CT HEAD/BRAIN W/O DYE: CPT | Performed by: EMERGENCY MEDICINE

## 2020-11-04 PROCEDURE — 72125 CT NECK SPINE W/O DYE: CPT | Performed by: EMERGENCY MEDICINE

## 2020-11-04 RX ORDER — ACETAMINOPHEN 500 MG
1000 TABLET ORAL ONCE
Status: COMPLETED | OUTPATIENT
Start: 2020-11-04 | End: 2020-11-04

## 2020-11-05 NOTE — ED INITIAL ASSESSMENT (HPI)
Patient arrives per EMS for evaluation of fall. States tripped and fell.  EMS noted BP of 214/90 with recent increase in blood pressure meds

## 2020-11-05 NOTE — ED PROVIDER NOTES
Patient Seen in: [facility] Emergency Department      History   Patient presents with:  Fall  Hypertension    Stated Complaint: fall,HTN    HPI    Is a pleasant 51-year-old female presenting to the emergency department for fall.   Patient reports get disc disease 12/27/2011   • Lumbar spinal stenosis 12/27/2011   • Macular hole    • Miscarriage 1959   • Osteoarthritis, multiple sites 12/27/2011   • Osteoarthrosis, unspecified whether generalized or localized, unspecified site 6/29/2012   • Other screen cervical cervical spine tenderness palpation no bony step-offs noted.   Otherwise back exam is normal HEENT exam is normal lungs clear cardiovascular exam shows regular rhythm abdomen soft nontender extremity no calcinosis edema no rash back exam is otherwi

## 2020-11-11 ENCOUNTER — OFFICE VISIT (OUTPATIENT)
Dept: INTERNAL MEDICINE CLINIC | Facility: CLINIC | Age: 85
End: 2020-11-11
Payer: MEDICARE

## 2020-11-11 VITALS
RESPIRATION RATE: 12 BRPM | WEIGHT: 181.88 LBS | BODY MASS INDEX: 31.05 KG/M2 | SYSTOLIC BLOOD PRESSURE: 154 MMHG | HEIGHT: 64 IN | DIASTOLIC BLOOD PRESSURE: 70 MMHG | HEART RATE: 76 BPM | TEMPERATURE: 99 F
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
## 2020-11-11 DIAGNOSIS — R94.01 ABNORMAL EEG: ICD-10-CM

## 2020-11-11 DIAGNOSIS — I89.0 LYMPHEDEMA: ICD-10-CM

## 2020-11-11 DIAGNOSIS — R32 URINARY INCONTINENCE, UNSPECIFIED TYPE: ICD-10-CM

## 2020-11-11 DIAGNOSIS — I25.10 CORONARY ARTERY DISEASE INVOLVING NATIVE CORONARY ARTERY OF NATIVE HEART WITHOUT ANGINA PECTORIS: ICD-10-CM

## 2020-11-11 DIAGNOSIS — I10 ESSENTIAL HYPERTENSION: ICD-10-CM

## 2020-11-11 DIAGNOSIS — Z00.00 ENCOUNTER FOR ANNUAL HEALTH EXAMINATION: Primary | ICD-10-CM

## 2020-11-11 PROCEDURE — 99214 OFFICE O/P EST MOD 30 MIN: CPT | Performed by: INTERNAL MEDICINE

## 2020-11-11 PROCEDURE — G0439 PPPS, SUBSEQ VISIT: HCPCS | Performed by: INTERNAL MEDICINE

## 2020-11-11 RX ORDER — OXYBUTYNIN CHLORIDE 5 MG/1
5 TABLET, EXTENDED RELEASE ORAL DAILY
Qty: 30 TABLET | Refills: 0 | Status: SHIPPED | OUTPATIENT
Start: 2020-11-11 | End: 2021-03-10

## 2020-11-11 NOTE — PROGRESS NOTES
HPI:   Daren Marks is a 80year old female who presents for a med check and Medicare Subsequent Annual Wellness visit (Pt already had Initial Annual Wellness). She fell last week. Not sure how she fell.  She woke up to go to the bathroom and fel (P) 0-No  Have you had any memory issues?: (P) 1-Yes  Fall/Risk Scoring: (P) 8      Cognitive Assessment   She had a completely normal cognitive assessment- see flowsheet entries    Functional Ability/Status   Juan A Watson has some abnormal functio months ago. Social History    Tobacco Use      Smoking status: Former Smoker        Years: 2.00      Smokeless tobacco: Never Used       Ms. Carli Marquez does not currently take aspirin.      CAGE Alcohol screening   Conrado Gonzalez was screened for [place] Lab Results   Component Value Date    WBC 6.0 09/27/2016    HGB 13.2 09/27/2016    .0 09/27/2016        ALLERGIES:   She is allergic to allergy; astelin; benadryl [diphenhydramine hcl]; beta adrenergic blockers; canola oil; celebrex [celecoxib]; f generalized or localized, unspecified site (6/29/2012), Other screening mammogram (6/29/2012), Palpitations, Pre-syncope (3/12/2012), Rapid heart beat (2003), Rheumatic fever, RUQ pain, Sinusitis, SOB (shortness of breath), SSS (sick sinus syndrome) ([facility] strength normal.     Vaccination History     Immunization History   Administered Date(s) Administered   • >=3 YRS TRI  MULTIDOSE VIAL (86950) FLU CLINIC 11/12/2014   • FLU VAC High Dose 72 YRS & Older PRSV Free (66071) 12/15/2015, 01/09/2017, 10/21/2019 diet, lifestyle, and exercise. Return in about 6 months (around 5/11/2021) for med check.      Beto Jennings MD, 11/11/2020     General Health     In the past six months, have you lost more than 10 pounds without trying?: (P) 2 - No  Has your appetite bee reminders to display for this patient. Update Health Maintenance if applicable    Chlamydia  Annually if high risk No results found for: CHLAMYDIA No flowsheet data found.     Screening Mammogram      Mammogram Annually to 76, then as discussed There are no No flowsheet data found.                  Template: [employer] MEDICARE ANNUAL ASSESSMENT FEMALE [55092]

## 2020-11-11 NOTE — PATIENT INSTRUCTIONS
Samantha Gupta's SCREENING SCHEDULE   Tests on this list are recommended by your physician but may not be covered, or covered at this frequency, by your insurer. Please check with your insurance carrier before scheduling to verify coverage.    Edel Hughes 73-68 years old and have smoked more than 100 cigarettes in their lifetime   • Anyone with a family history    Colorectal Cancer Screening  Covered up to Age 76     Colonoscopy Screen   Covered every 10 years- more often if abnormal There are no preventive Influenza  Covered Annually Orders placed or performed in visit on 10/21/19   • FLU VACC HIGH DOSE PRSV FREE    Please get every year    Pneumococcal 13 (Prevnar)  Covered Once after 65 Orders placed or performed in visit on 09/04/15   • PNEUMOCOCCAL VACC,

## 2020-12-07 ENCOUNTER — PATIENT MESSAGE (OUTPATIENT)
Dept: INTERNAL MEDICINE CLINIC | Facility: CLINIC | Age: 85
End: 2020-12-07

## 2020-12-07 NOTE — TELEPHONE ENCOUNTER
From: Deion Lobo  To: Misha Merino MD  Sent: 12/7/2020 11:32 AM CST  Subject: Other    Recent blood pressure readings

## 2020-12-08 RX ORDER — ENALAPRIL MALEATE 5 MG/1
5 TABLET ORAL 2 TIMES DAILY
Qty: 60 TABLET | Refills: 0 | Status: SHIPPED | OUTPATIENT
Start: 2020-12-08 | End: 2020-12-28

## 2020-12-08 NOTE — TELEPHONE ENCOUNTER
bp elevated at home. Increase enalapril to 5mg bid. Continue to monitor and send me readings in a month. Please inform pt and daughter.

## 2020-12-28 ENCOUNTER — PATIENT MESSAGE (OUTPATIENT)
Dept: INTERNAL MEDICINE CLINIC | Facility: CLINIC | Age: 85
End: 2020-12-28

## 2020-12-28 ENCOUNTER — TELEPHONE (OUTPATIENT)
Dept: INTERNAL MEDICINE CLINIC | Facility: CLINIC | Age: 85
End: 2020-12-28

## 2020-12-28 DIAGNOSIS — I10 ESSENTIAL HYPERTENSION: Primary | ICD-10-CM

## 2020-12-28 RX ORDER — ENALAPRIL MALEATE 20 MG/1
20 TABLET ORAL DAILY
Qty: 30 TABLET | Refills: 0 | Status: SHIPPED | OUTPATIENT
Start: 2020-12-28 | End: 2021-01-25

## 2020-12-28 NOTE — TELEPHONE ENCOUNTER
Spoke to pt. Pt states had a dizzy episode today but only when she was getting up. Pt states dizziness went away after standing still for a minute. Advised of new dose & explained d/t BP still elevated.   Reinforced education on taking precaution when ch

## 2020-12-28 NOTE — TELEPHONE ENCOUNTER
From: Omero Escobar  To: Asmita Blanco MD  Sent: 12/28/2020 12:30 PM CST  Subject: Non-Urgent Medical Question    Blood pressure readings  Reading missing 10:00 am 12/27/20 was   128/73

## 2020-12-28 NOTE — TELEPHONE ENCOUNTER
See Alonzohart encounter. Are her dizzy spells frequent or was it just the one? If just one increase enalapril to 20mg daily and monitor bp as in MyChart encounter. If dizziness increases let me know.    Always have to be careful when making position souza

## 2020-12-28 NOTE — TELEPHONE ENCOUNTER
Pt called and stated that Dr. Ulysses Lynne had changed one of her medications by increasing it, the medication is Enalapril Maleate 5 MG Oral Tab, but lately the medication has been causing dizzy spells.  Pt wants to know if the medication can be half the amount or

## 2020-12-28 NOTE — TELEPHONE ENCOUNTER
See note below. Spoke to pt's daughter [facility] on HIPAA. States need to call the home number to triage pt. Daughter states will send Measy[employer] message with BP readings. See Patient Message encounter. Spoke to pt.   States she had a dizzy spell on [employer]

## 2020-12-28 NOTE — TELEPHONE ENCOUNTER
Noted bp readings. These are high readings. She is currently taking enalapril 10mg daily. Increase enalapril to 20mg daily. Send me readings in a month. Please order and inform pt.

## 2021-01-23 DIAGNOSIS — I10 ESSENTIAL HYPERTENSION: ICD-10-CM

## 2021-01-25 RX ORDER — ENALAPRIL MALEATE 20 MG/1
TABLET ORAL
Qty: 90 TABLET | Refills: 0 | Status: SHIPPED | OUTPATIENT
Start: 2021-01-25 | End: 2021-04-19

## 2021-02-02 DIAGNOSIS — Z23 NEED FOR VACCINATION: ICD-10-CM

## 2021-02-08 ENCOUNTER — PATIENT MESSAGE (OUTPATIENT)
Dept: INTERNAL MEDICINE CLINIC | Facility: CLINIC | Age: 86
End: 2021-02-08

## 2021-02-08 DIAGNOSIS — R41.0 CONFUSION: Primary | ICD-10-CM

## 2021-02-08 NOTE — TELEPHONE ENCOUNTER
Noted below. She would need to call 911 to take pt to the ER for the worsening confusion. That would be the first and foremost recommendation as that is the most comprehensive way to have the patient evaluated.    If she is not agreeable to this and if sh

## 2021-02-08 NOTE — TELEPHONE ENCOUNTER
Worsening confusion is worrisome. She should be evaluated for this. uti is a possibility and is commonly a cause of worsening confusion but there are many other things that can cause this. Any fever?    Order a ua and urine culture will not solve the proble

## 2021-02-08 NOTE — TELEPHONE ENCOUNTER
FYI  See most recent MyChart response. Daughter states \"no way to get her out of the house right now\". DON from [facility] to reassess pt & spouse tomorrow morning. Advised daughter:   \"If any of the following occur with her confusion, please call

## 2021-02-08 NOTE — TELEPHONE ENCOUNTER
From: Conrado Gonzalez  To: Naina Marshall MD  Sent: 2/8/2021 10:02 AM CST  Subject: Non-Urgent Medical Question    I think my mom may have a UTI. She is wearing diapers all the time and her confusion seems to be worse.  We cannot get her out for an in of

## 2021-02-09 NOTE — TELEPHONE ENCOUNTER
Spoke with Patient and her Daughter, informed her the orders have been faxed as requested to [facility] [phone]. Confirmed patient's home address. Both Patient and daughter stated understanding and have no further questions at this time.

## 2021-02-11 NOTE — TELEPHONE ENCOUNTER
Orders placed, face sheet for company daughter wish to have come out to the house filled out. Please sign if all looks good and I will fax it.      Thank you

## 2021-02-12 ENCOUNTER — PATIENT MESSAGE (OUTPATIENT)
Dept: INTERNAL MEDICINE CLINIC | Facility: CLINIC | Age: 86
End: 2021-02-12

## 2021-02-12 NOTE — TELEPHONE ENCOUNTER
See other 2/8/21 Patient Message encounter. Awaiting Dr. Romulo Rosales signature on [facility] form.

## 2021-02-12 NOTE — TELEPHONE ENCOUNTER
From: Morteza Weston  To: Jo Ann Capps MD  Sent: 2/12/2021 10:05 AM CST  Subject: Other    Hi I called [facility] this morning since my mom hasn’t heard from them in when they would be coming to her house for lab work.  They said they did not

## 2021-02-15 LAB
AMB EXT CREATININE: 0.9 MG/DL
AMB EXT GFR: 59
AMB EXT GLUCOSE: 83 MG/DL
AMB EXT HEMATOCRIT: 37.4
AMB EXT HEMOGLOBIN: 11.5
AMB EXT MCV: 104
AMB EXT PLATELETS: 297
AMB EXT TSH: 2.33 MIU/ML
AMB EXT WBC: 6.6 X10(3)UL

## 2021-02-18 ENCOUNTER — PATIENT MESSAGE (OUTPATIENT)
Dept: INTERNAL MEDICINE CLINIC | Facility: CLINIC | Age: 86
End: 2021-02-18

## 2021-02-18 NOTE — TELEPHONE ENCOUNTER
[facility], she will be faxing results over now.

## 2021-02-18 NOTE — TELEPHONE ENCOUNTER
From: Cynthia Augustin  To: Yany Villa MD  Sent: 2/18/2021 12:28 PM CST  Subject: Test Results Question    Have you received the lab results from [facility]? They did the blood draws on 2/15/21.  Unable to collect urine as she cannot get out

## 2021-02-22 ENCOUNTER — TELEPHONE (OUTPATIENT)
Dept: INTERNAL MEDICINE CLINIC | Facility: CLINIC | Age: 86
End: 2021-02-22

## 2021-02-22 DIAGNOSIS — D64.9 MILD ANEMIA: Primary | ICD-10-CM

## 2021-02-22 LAB
AMB EXT BILIRUBIN URINE: NEGATIVE
AMB EXT GLUCOSE URINE: NEGATIVE
AMB EXT KETONES URINE: NEGATIVE
AMB EXT NITRITE URINE: POSITIVE
AMB EXT PH URINE: 5.5
AMB EXT URINE CLARITY: CLEAR
AMB EXT URINE COLOR: YELLOW
AMB EXT URINE SPECIFIC GRAVITY: 1.02
AMB EXT UROBILINOGEN URINE: 0.2

## 2021-02-22 NOTE — TELEPHONE ENCOUNTER
cmp normal. tsh normal.   Cbc with mild anemia, elevated mcv. Please make sure no bleeding, bruising, melena. Can we add on O31 and folic acid levels and iron, tibc, ferritin?

## 2021-02-22 NOTE — TELEPHONE ENCOUNTER
Gladis johnson [place] [address] tamela a patient  Coordinator calling to ask if they can have pt progress notes from her last visit on 11/11/20, because that was last time pt was seen in our office     Please advise    Thank you     Fax# [phone]  Phone# [phone]

## 2021-02-22 NOTE — TELEPHONE ENCOUNTER
Faxed copy of 11/11/2020 OV notes & lab orders for UA & CX to number listed below. Confirmed fax sent.

## 2021-02-22 NOTE — TELEPHONE ENCOUNTER
Unable to add on to existing specimen since over 4 days ago. However, [address] [place] [address] will be visiting pt & nurse can draw labs. Labs ordered & faxed to [address] [place] [address]  Fax# [phone]  Phone# [phone]    Confirmed fax sent.     MyChart sent to [facility]

## 2021-02-22 NOTE — TELEPHONE ENCOUNTER
Incoming (mail or fax): fax  Received from: [employer]  Documentation given to: Dr. Yuila Ramirez    Received CBC, CMP & TSH w/ REFLEX results. Updated Ext Result Console. See 2/18/21 Patient Message encounter.   UA & Cx still need to be collec

## 2021-02-24 ENCOUNTER — MED REC SCAN ONLY (OUTPATIENT)
Dept: INTERNAL MEDICINE CLINIC | Facility: CLINIC | Age: 86
End: 2021-02-24

## 2021-02-25 ENCOUNTER — TELEPHONE (OUTPATIENT)
Dept: INTERNAL MEDICINE CLINIC | Facility: CLINIC | Age: 86
End: 2021-02-25

## 2021-02-25 DIAGNOSIS — D64.9 MILD ANEMIA: ICD-10-CM

## 2021-02-25 DIAGNOSIS — N30.00 ACUTE CYSTITIS WITHOUT HEMATURIA: Primary | ICD-10-CM

## 2021-02-25 RX ORDER — CEPHALEXIN 500 MG/1
500 CAPSULE ORAL 2 TIMES DAILY
Qty: 14 CAPSULE | Refills: 0 | Status: SHIPPED | OUTPATIENT
Start: 2021-02-25 | End: 2021-03-04

## 2021-02-25 NOTE — TELEPHONE ENCOUNTER
Incoming (mail or fax): fax  Received from: [facility]  Documentation given to: Dr. Trevor Castellon, Polina Andrews results as ordered 2/22/21.

## 2021-02-25 NOTE — TELEPHONE ENCOUNTER
Noted the patient has a UTI with E. coli greater than 100,000. Please order cephalexin 500 mg twice daily for 1 week. Let the patient know to take it with food. Eat yogurt a couple times a day or take an oral probiotic.   Monitor for side effects and all

## 2021-02-25 NOTE — TELEPHONE ENCOUNTER
Spoke to pt's daughter abimael Shearer per HIPAA. Made aware of results & recommendations. Pt's daughter voiced understanding. Spoke to [facility] from [facility].   Made aware of recommendations & rx sent to [place].  Lab to be done 1 week after antibiotic c

## 2021-02-25 NOTE — TELEPHONE ENCOUNTER
Incoming (mail or fax): fax  Received from: [facility]  Documentation given to: Debra    Received UA/CX results. Collected by home health nurse via straight cath. Noted order from 2/9/21. Noted positive UA. Please advise treatment.   To fax orde

## 2021-02-26 ENCOUNTER — PATIENT MESSAGE (OUTPATIENT)
Dept: INTERNAL MEDICINE CLINIC | Facility: CLINIC | Age: 86
End: 2021-02-26

## 2021-02-26 NOTE — TELEPHONE ENCOUNTER
Received iron levels and C40 and folic acid levels. U76 and folic acid normal.   Iron levels normal. Ferritin is elevated. Please make sure she is not taking any iron supplements.    Will monitor for now given the difficulty in getting pt out of the hous

## 2021-02-26 NOTE — TELEPHONE ENCOUNTER
From: Agustina Ornelas  To: Gianna Mercer MD  Sent: 2/26/2021 11:40 AM CST  Subject: Non-Urgent Medical Question    My mom is not taking any iron supplements or vitamins.

## 2021-02-26 NOTE — TELEPHONE ENCOUNTER
Labs ordered. [employer] message sent to pt/daughter informing of results & recommendations. Faxed copy of orders to [facility]. Confirmed fax sent.

## 2021-03-01 ENCOUNTER — TELEPHONE (OUTPATIENT)
Dept: INTERNAL MEDICINE CLINIC | Facility: CLINIC | Age: 86
End: 2021-03-01

## 2021-03-01 NOTE — TELEPHONE ENCOUNTER
Spoke with Lacey Talley, home health. As of now pt has not had BM in 7 days. Pt reports feeling like has to go, like stool is at rectum but is unable to go. Has tried colace 100mg every day, and has tried glycerin suppositories 2x without success.    Nurse asking

## 2021-03-01 NOTE — TELEPHONE ENCOUNTER
Can try dulcolax suppository daily for 2-3 days to see if this helps. Can also try taking miralax daily, take 1 capful with 8 oz of water. This is otc. If any abdominal pain or rectal bleeding will need to go to the ER.

## 2021-03-01 NOTE — TELEPHONE ENCOUNTER
Fransisca Keating a nurse calling from [facility] saw pt on Saturday and said that pt has not had any bowel movement for a week, nurse spoke to pt daughter today and pt still has not had any, nurse said that pt [place] 100mg a  Day and 2 glycerins      Please advise    #[phone]

## 2021-03-02 ENCOUNTER — MED REC SCAN ONLY (OUTPATIENT)
Dept: INTERNAL MEDICINE CLINIC | Facility: CLINIC | Age: 86
End: 2021-03-02

## 2021-03-08 DIAGNOSIS — R32 URINARY INCONTINENCE, UNSPECIFIED TYPE: ICD-10-CM

## 2021-03-10 RX ORDER — OXYBUTYNIN CHLORIDE 5 MG/1
5 TABLET, EXTENDED RELEASE ORAL DAILY
Qty: 90 TABLET | Refills: 0 | Status: ON HOLD | OUTPATIENT
Start: 2021-03-10 | End: 2021-06-16

## 2021-03-10 NOTE — TELEPHONE ENCOUNTER
Last OV relevant to medication: 11/11/2020, AWV  Last refill date:11/11/2020     #/refills: 30-0  When pt was asked to return for OV: 6 months  Upcoming appt/reason: none scheduled  Was pt informed of any over due labs: yes

## 2021-03-12 LAB
AMB EXT BILIRUBIN URINE: NEGATIVE
AMB EXT GLUCOSE URINE: NEGATIVE
AMB EXT KETONES URINE: NEGATIVE
AMB EXT LEUKOCYTE ESTERASE URINE: NEGATIVE
AMB EXT NITRITE URINE: NEGATIVE
AMB EXT PH URINE: 7
AMB EXT PROTEIN URINE: NEGATIVE
AMB EXT URINE CLARITY: CLEAR
AMB EXT URINE COLOR: YELLOW
AMB EXT URINE SPECIFIC GRAVITY: 1.02
AMB EXT UROBILINOGEN URINE: 0.2

## 2021-03-16 ENCOUNTER — TELEPHONE (OUTPATIENT)
Dept: INTERNAL MEDICINE CLINIC | Facility: CLINIC | Age: 86
End: 2021-03-16

## 2021-03-16 NOTE — TELEPHONE ENCOUNTER
Received UA & CX results from [facility]. This test was follow-up after UTI treatment. See 2/25/21 TE. Updated Ext Result Console as able  To Dr. Ulysses Lynne for review.     Noted last UA 2/22/21 -- see r/t 2/8/21 Patient Message encounter, where UA origi

## 2021-03-16 NOTE — TELEPHONE ENCOUNTER
Incoming (mail or fax):  fax  Received from:  [address] home health   Documentation given to:   lab reports

## 2021-03-17 NOTE — TELEPHONE ENCOUNTER
Repeat urine culture shows a mild uti still present. Treat with cipro 250mg po bid for 3 days. Please order and inform pt. If confusion still present after this she needs a visit.

## 2021-03-18 RX ORDER — CIPROFLOXACIN 250 MG/1
250 TABLET, FILM COATED ORAL 2 TIMES DAILY
Qty: 6 TABLET | Refills: 0 | Status: SHIPPED | OUTPATIENT
Start: 2021-03-18 | End: 2021-03-21

## 2021-03-25 ENCOUNTER — PATIENT MESSAGE (OUTPATIENT)
Dept: INTERNAL MEDICINE CLINIC | Facility: CLINIC | Age: 86
End: 2021-03-25

## 2021-03-25 NOTE — TELEPHONE ENCOUNTER
From: Omero Escobar  To: Asmita Blanco MD  Sent: 3/25/2021 2:11 PM CDT  Subject: Non-Urgent Medical Question    Hi  My mom is homebound so not able to get her COVID vaccine by scheduling here.   Her home health agency does not have a supply of the vac

## 2021-04-05 ENCOUNTER — APPOINTMENT (OUTPATIENT)
Dept: CARDIOLOGY | Age: 86
End: 2021-04-05

## 2021-04-18 DIAGNOSIS — I10 ESSENTIAL HYPERTENSION: ICD-10-CM

## 2021-04-19 RX ORDER — ENALAPRIL MALEATE 20 MG/1
TABLET ORAL
Qty: 90 TABLET | Refills: 0 | Status: SHIPPED | OUTPATIENT
Start: 2021-04-19 | End: 2021-06-14 | Stop reason: CLARIF

## 2021-04-26 ENCOUNTER — TELEPHONE (OUTPATIENT)
Dept: CARDIOLOGY | Age: 86
End: 2021-04-26

## 2021-05-26 ENCOUNTER — APPOINTMENT (OUTPATIENT)
Dept: GENERAL RADIOLOGY | Facility: HOSPITAL | Age: 86
End: 2021-05-26
Attending: EMERGENCY MEDICINE
Payer: MEDICARE

## 2021-05-26 ENCOUNTER — HOSPITAL ENCOUNTER (EMERGENCY)
Facility: HOSPITAL | Age: 86
Discharge: HOME | End: 2021-05-26
Attending: EMERGENCY MEDICINE
Payer: MEDICARE

## 2021-05-26 ENCOUNTER — APPOINTMENT (OUTPATIENT)
Dept: CT IMAGING | Facility: HOSPITAL | Age: 86
End: 2021-05-26
Attending: EMERGENCY MEDICINE
Payer: MEDICARE

## 2021-05-26 VITALS
HEART RATE: 89 BPM | BODY MASS INDEX: 31 KG/M2 | HEIGHT: 64 IN | TEMPERATURE: 98 F | OXYGEN SATURATION: 98 % | SYSTOLIC BLOOD PRESSURE: 167 MMHG | DIASTOLIC BLOOD PRESSURE: 79 MMHG | RESPIRATION RATE: 18 BRPM

## 2021-05-26 DIAGNOSIS — R42 DIZZINESS: Primary | ICD-10-CM

## 2021-05-26 DIAGNOSIS — E86.0 DEHYDRATION: ICD-10-CM

## 2021-05-26 LAB
AMB EXT HEMATOCRIT: 33.2
AMB EXT HEMOGLOBIN: 11.2
AMB EXT MCV: 100.5
AMB EXT PLATELETS: 306
AMB EXT WBC: 5.9 X10(3)UL

## 2021-05-26 PROCEDURE — 84484 ASSAY OF TROPONIN QUANT: CPT | Performed by: EMERGENCY MEDICINE

## 2021-05-26 PROCEDURE — 93005 ELECTROCARDIOGRAM TRACING: CPT

## 2021-05-26 PROCEDURE — 99285 EMERGENCY DEPT VISIT HI MDM: CPT

## 2021-05-26 PROCEDURE — 70450 CT HEAD/BRAIN W/O DYE: CPT | Performed by: EMERGENCY MEDICINE

## 2021-05-26 PROCEDURE — 96360 HYDRATION IV INFUSION INIT: CPT

## 2021-05-26 PROCEDURE — 96361 HYDRATE IV INFUSION ADD-ON: CPT

## 2021-05-26 PROCEDURE — 85025 COMPLETE CBC W/AUTO DIFF WBC: CPT | Performed by: EMERGENCY MEDICINE

## 2021-05-26 PROCEDURE — 80053 COMPREHEN METABOLIC PANEL: CPT | Performed by: EMERGENCY MEDICINE

## 2021-05-26 PROCEDURE — 71045 X-RAY EXAM CHEST 1 VIEW: CPT | Performed by: EMERGENCY MEDICINE

## 2021-05-26 PROCEDURE — 93010 ELECTROCARDIOGRAM REPORT: CPT

## 2021-05-26 PROCEDURE — 81001 URINALYSIS AUTO W/SCOPE: CPT | Performed by: EMERGENCY MEDICINE

## 2021-05-26 RX ORDER — SODIUM CHLORIDE 9 MG/ML
125 INJECTION, SOLUTION INTRAVENOUS CONTINUOUS
Status: DISCONTINUED | OUTPATIENT
Start: 2021-05-26 | End: 2021-05-26

## 2021-05-26 RX ORDER — MECLIZINE HYDROCHLORIDE 25 MG/1
25 TABLET ORAL 3 TIMES DAILY PRN
Qty: 15 TABLET | Refills: 0 | Status: SHIPPED | OUTPATIENT
Start: 2021-05-26 | End: 2021-05-31

## 2021-05-26 NOTE — ED INITIAL ASSESSMENT (HPI)
Pt states donated blood this morning, noted dizziness this afternoon. Denies c/o nausea, states dizziness has improved. Denies c/o pain.

## 2021-05-26 NOTE — ED PROVIDER NOTES
Patient Seen in: [facility] Emergency Department      History   Patient presents with:  Dizziness    Stated Complaint: dizzy    HPI/Subjective:   HPI    80-year-old female presents emergency room for evaluation of dizziness.   Patient states that she disease)     hip, knee   • DJD (degenerative joint disease)     cervical spine   • Dysphagia    • Dyspnea    • Dyspnea on exertion 6/6/2012   • Endometriosis    • Esophageal reflux 6/29/2012   • Gallbladder disorder 3/2/2005    gallbladder adenomyomatosis Used    Vaping Use      Vaping Use: Never used    Alcohol use: No    Drug use: No             Review of Systems    Positive for stated complaint: dizzy  Other systems are as noted in HPI. Constitutional and vital signs reviewed.       All other systems rev Small (*)     Leukocyte Esterase Urine Small (*)     Bacteria Urine 1+ (*)     Squamous Epi.  Cells Moderate (*)     All other components within normal limits   CBC W/ DIFFERENTIAL - Abnormal; Notable for the following components:    RBC 3.55 (*)     HGB 11 0.9 platelet 742.   On reevaluation patient states her symptoms have resolved, states she does not feel lightheaded or dizzy, patient does have normal neurologic exam.  Patient symptoms likely due to peripheral vertigo, discussed once again with the family

## 2021-05-28 ENCOUNTER — TELEPHONE (OUTPATIENT)
Dept: INTERNAL MEDICINE CLINIC | Facility: CLINIC | Age: 86
End: 2021-05-28

## 2021-05-28 NOTE — TELEPHONE ENCOUNTER
Reviewed cbc and iron studies. Hgb 11.2. there is another cbc from the same day in epic with hgb 11.9. stable hgb. Iron levels not low. Ferritin is still a bit high. Will monitor for now. She is due for a med check. Please have her schedule.    Sent

## 2021-05-28 NOTE — TELEPHONE ENCOUNTER
Received results for the following tests: CBC with differential and platelet, Iron study,  completed on 5/26/21 at [facility]  Updated Ext Result Console as able. To Dr. Tawana Collins for review.

## 2021-06-02 NOTE — TELEPHONE ENCOUNTER
Spoke with patient   Relayed lab results  Pt v/u  Advised to make follow up appt for med check  Pt stated she will have to check with daughter to see when can she bring her in for appt  Advised to do it sooner  Pt v/u

## 2021-06-09 ENCOUNTER — MED REC SCAN ONLY (OUTPATIENT)
Dept: INTERNAL MEDICINE CLINIC | Facility: CLINIC | Age: 86
End: 2021-06-09

## 2021-06-14 ENCOUNTER — HOSPITAL ENCOUNTER (INPATIENT)
Facility: HOSPITAL | Age: 86
LOS: 2 days | Discharge: HOME | DRG: 178 | End: 2021-06-16
Attending: EMERGENCY MEDICINE | Admitting: HOSPITALIST
Payer: MEDICARE

## 2021-06-14 ENCOUNTER — APPOINTMENT (OUTPATIENT)
Dept: GENERAL RADIOLOGY | Facility: HOSPITAL | Age: 86
DRG: 178 | End: 2021-06-14
Attending: EMERGENCY MEDICINE
Payer: MEDICARE

## 2021-06-14 DIAGNOSIS — J90 PLEURAL EFFUSION: ICD-10-CM

## 2021-06-14 DIAGNOSIS — R09.02 HYPOXIA: Primary | ICD-10-CM

## 2021-06-14 DIAGNOSIS — J18.9 COMMUNITY ACQUIRED PNEUMONIA OF LEFT LOWER LOBE OF LUNG: ICD-10-CM

## 2021-06-14 PROCEDURE — 99223 1ST HOSP IP/OBS HIGH 75: CPT | Performed by: HOSPITALIST

## 2021-06-14 PROCEDURE — 71045 X-RAY EXAM CHEST 1 VIEW: CPT | Performed by: EMERGENCY MEDICINE

## 2021-06-14 RX ORDER — QUETIAPINE 25 MG/1
25 TABLET, FILM COATED ORAL NIGHTLY
COMMUNITY
End: 2021-07-26

## 2021-06-14 RX ORDER — ENOXAPARIN SODIUM 100 MG/ML
40 INJECTION SUBCUTANEOUS DAILY
Status: DISCONTINUED | OUTPATIENT
Start: 2021-06-14 | End: 2021-06-16

## 2021-06-14 RX ORDER — QUETIAPINE 25 MG/1
12.5 TABLET, FILM COATED ORAL 2 TIMES DAILY
COMMUNITY
End: 2021-08-17

## 2021-06-14 RX ORDER — SODIUM CHLORIDE 9 MG/ML
INJECTION, SOLUTION INTRAVENOUS CONTINUOUS
Status: DISCONTINUED | OUTPATIENT
Start: 2021-06-14 | End: 2021-06-16

## 2021-06-14 RX ORDER — OXYBUTYNIN CHLORIDE 5 MG/1
5 TABLET, EXTENDED RELEASE ORAL DAILY
Status: DISCONTINUED | OUTPATIENT
Start: 2021-06-14 | End: 2021-06-15

## 2021-06-14 RX ORDER — ENALAPRIL MALEATE 20 MG/1
20 TABLET ORAL DAILY
COMMUNITY

## 2021-06-14 NOTE — PROGRESS NOTES
NURSING ADMISSION NOTE      Patient admitted via Cart  Oriented to room. Safety precautions initiated. Bed in low position. Call light in reach. Pt A&Ox3-4. Intermittently forgetful but otherwise alert. Pt is bed bound and does not walk.  Has not

## 2021-06-14 NOTE — ED QUICK NOTES
Helen Sotelo now in place. Diaper in place on arrival to er = saturated = changed. Pt states caregiver left at 2000 Sunday and should return later this am. Pt lives with spouse.

## 2021-06-14 NOTE — ED PROVIDER NOTES
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 Constitutional and vital signs reviewed. All other systems reviewed and negative except as noted above.     Physical Exam     ED Triage Vitals [06/14/21 8017 for the following components:       Result Value    BUN/CREA Ratio 24.0 (*)     ALT 12 (*)     Total Protein 6.2 (*)     Albumin 2.6 (*)     A/G Ratio 0.7 (*)     All other components within normal limits   URINALYSIS WITH CULTURE REFLEX - Abnormal; Notabl states that since she recently had 1 on May 26, she does not feel there would be any utility of this. She has no localizing neurologic deficits or any history of trauma I believe this is reasonable to defer at this time.          MDM          XR CHEST AP P Disposition and Plan     Clinical Impression:  Hypoxia  (primary encounter diagnosis)  Community acquired pneumonia of left lower lobe of lung  Pleural effusion     Disposition:  Admit  6/14/2021  6:31 am    Follow-up:  No foll

## 2021-06-14 NOTE — ED QUICK NOTES
Pt requested we contact her daughter Loraine Steele = called and updated on today's visit. Daughter states this has happened before and she will contact pt's son. ermd updated.

## 2021-06-14 NOTE — ED INITIAL ASSESSMENT (HPI)
Pt called ems for feeling different this morning  She has made changes to her meds  Ems state pt is non ambulatory and lives at home with a caregiver  -does not walk anymore yet was having visions of walking again

## 2021-06-14 NOTE — H&P
[facility]  History and Physical     [facility] Patient Status:  Emergency    3/15/1930 MRN [id number]   Location [address] Attending Molly Norwood MD   Hosp Day # 0 PCP Germaine Jean MD     Chief Compl 3/3/2005   • Irregular heart beat 2003   • Lumbar degenerative disc disease 12/27/2011   • Lumbar spinal stenosis 12/27/2011   • Macular hole    • Miscarriage 1959   • Osteoarthritis, multiple sites 12/27/2011   • Osteoarthrosis, unspecified whether genera gets cold and shakes  Beta Adrenergic Blo*    DIZZINESS  Canola Oil                  Comment:CANOLA AND SOY OIL-Reaction: intestinal upset  Celebrex [Celecoxib]      Food                        Comment:Preservatives-intestinal upset  Hydromorphone Positive bowel sounds. No rebound, guarding or organomegaly. Neurologic: No focal neurological deficits. CNII-XII grossly intact. Musculoskeletal: Moves all extremities. Extremities: atrophy of legs  Integument: No rashes or lesions.          Diagnostic

## 2021-06-15 PROCEDURE — 99233 SBSQ HOSP IP/OBS HIGH 50: CPT | Performed by: HOSPITALIST

## 2021-06-15 RX ORDER — QUETIAPINE 25 MG/1
25 TABLET, FILM COATED ORAL NIGHTLY
Status: DISCONTINUED | OUTPATIENT
Start: 2021-06-15 | End: 2021-06-16

## 2021-06-15 RX ORDER — QUETIAPINE 25 MG/1
25 TABLET, FILM COATED ORAL NIGHTLY
Status: DISCONTINUED | OUTPATIENT
Start: 2021-06-15 | End: 2021-06-15

## 2021-06-15 RX ORDER — ENALAPRIL MALEATE 10 MG/1
20 TABLET ORAL DAILY
Status: DISCONTINUED | OUTPATIENT
Start: 2021-06-15 | End: 2021-06-16

## 2021-06-15 NOTE — CM/SW NOTE
06/15/21 1400   CM/SW Referral Data   Referral Source Social Work (self-referral)   Reason for Referral Discharge planning   Informant Children   Patient Info   Patient's Mental Status Confused;Memory Impairments   Patient's [address]   Pat

## 2021-06-15 NOTE — PLAN OF CARE
Pt forgetful and confused intermittently throughout shift. With orientation questions pt A&Ox2-3 throughout the shift. Multiple episodes noted where pt thinks her  is standing in the liang, or she feels she is falling out of bed while lying still.  Pt

## 2021-06-15 NOTE — OCCUPATIONAL THERAPY NOTE
OT orders received to eval and treat. Chart reviewed and case discussed with PT and phone call placed to daughter, Geovanna Zamora. Per daughter, patient has been bed bound since Jan/2021. Patient resists movement and reports fear of falling.   Patient has caregiv

## 2021-06-15 NOTE — CM/SW NOTE
Flynn Matos can provide katiuska lift.  They will follow up w/the pt's daughter tomorrow to arrange delivery

## 2021-06-15 NOTE — PHYSICAL THERAPY NOTE
PHYSICAL THERAPY QUICK EVALUATION - INPATIENT    Room Number: 432/432-A  Evaluation Date: 6/15/2021  Presenting Problem: PNA with hypoxia, AMS  Physician Order: PT Eval and Treat    Problem List  Principal Problem:    Hypoxia  Active Problems:    Communi Other screening mammogram 6/29/2012   • Palpitations    • Pre-syncope 3/12/2012   • Rapid heart beat 2003   • Rheumatic fever    • RUQ pain    • Sinusitis    • SOB (shortness of breath)    • SSS (sick sinus syndrome) ([facility] 3/5/2012    early SSS   • Tachyca 3-/5          ACTIVITY TOLERANCE;         O2 WALK       AM-PAC '6-Clicks' INPATIENT SHORT FORM - BASIC MOBILITY  How much difficulty does the patient currently have. ..  -   Turning over in bed (including adjusting bedclothes, sheets and blankets)?: A Lot until when I tried assisting her to the EOB, that she became agitated and with retropulsion, resisting and screaming and expressing that she does not want to do it and does not want to fall. Placed back inn bed and made comfortable.       OT spoke to ángel

## 2021-06-16 ENCOUNTER — TELEPHONE (OUTPATIENT)
Dept: INTERNAL MEDICINE CLINIC | Facility: CLINIC | Age: 86
End: 2021-06-16

## 2021-06-16 VITALS
SYSTOLIC BLOOD PRESSURE: 141 MMHG | WEIGHT: 163.13 LBS | TEMPERATURE: 98 F | OXYGEN SATURATION: 100 % | HEART RATE: 77 BPM | DIASTOLIC BLOOD PRESSURE: 69 MMHG | BODY MASS INDEX: 27.85 KG/M2 | RESPIRATION RATE: 20 BRPM | HEIGHT: 64 IN

## 2021-06-16 DIAGNOSIS — Z76.89 ENCOUNTER FOR SUPPORT AND COORDINATION OF TRANSITION OF CARE: ICD-10-CM

## 2021-06-16 DIAGNOSIS — Z09 HOSPITAL DISCHARGE FOLLOW-UP: Primary | ICD-10-CM

## 2021-06-16 PROCEDURE — 99239 HOSP IP/OBS DSCHRG MGMT >30: CPT | Performed by: INTERNAL MEDICINE

## 2021-06-16 RX ORDER — LEVOFLOXACIN 500 MG/1
500 TABLET, FILM COATED ORAL DAILY
Qty: 7 TABLET | Refills: 0 | Status: ON HOLD | OUTPATIENT
Start: 2021-06-17 | End: 2021-06-21

## 2021-06-16 NOTE — PLAN OF CARE
PROBLEM: PNA, left pleural effusion     ASSESSMENT: Pt is A&Ox4, intermittent confusion - stating she \"needs to lie down\" while she is already in bed. Cooperative, no agitation or combative behavior noted.  Maintaining O2 sat WNL on RA, denies SOB or chse

## 2021-06-16 NOTE — CM/SW NOTE
06/16/21 1206   Discharge disposition   Expected discharge disposition Home-Health   Name of [address] Provider   ([facility] )   E provider   ([place] [facility] [place] [facility])   [address]

## 2021-06-16 NOTE — CM/SW NOTE
CM was asked to speak with patient's son regarding some questions that he has. He had concerns about the home health agency having trained staff that can use the [facility] lift.  CM called [facility] and confirmed that they have physical therapists w

## 2021-06-16 NOTE — DISCHARGE SUMMARY
[facility] HOSPITALIST  DISCHARGE SUMMARY     Jie Deras Patient Status:  Inpatient    3/15/1930 MRN [id number]   [facility] 4NW-A Attending Madelaine Carter, DO   Hosp Day # 2 PCP Umang Carter MD     Date of Admission: 2021  Date o levofloxacin 500 MG Tabs  Commonly known as: LEVAQUIN  Start taking on: June 17, 2021      Take 1 tablet (500 mg total) by mouth daily for 7 days.    Stop taking on: June 24, 2021  Quantity: 7 tablet  Refills: 0        CONTINUE taking these medications

## 2021-06-16 NOTE — PLAN OF CARE
Alert and oriented x 2-3. Agitated at beginning of shift but calmed down after dose of seroquel. VSS. Afebrile. No c/o pain or SOB. Incontinent. IV fluids infusing. On IV rocephin and zithromax. Resting comfortably. Will continue to monitor.

## 2021-06-16 NOTE — PROGRESS NOTES
NURSING ADMISSION NOTE      Patient admitted via Cart  Oriented to room. Safety precautions initiated. Bed in low position. Call light in reach.     Discharge instructions, prescribed medications, and follow-up instructions were discussed with pt and

## 2021-06-16 NOTE — PROGRESS NOTES
[facility] HOSPITALIST  Progress Note     Villa Cosby Patient Status:  Inpatient    3/15/1930 MRN [id number]   [facility] 4NW-A Attending Chrystal Oppenheim, MD   Hosp Day # 2 PCP Germaine Jean MD     Chief Complaint: AMS     S:  Confusio Pro-Calcitonin  No results for input(s): PCT in the last 168 hours. Cardiac  No results for input(s): TROP, PBNP in the last 168 hours. Creatinine Kinase  No results for input(s): CK in the last 168 hours.     Inflammatory Markers  No results for

## 2021-06-17 ENCOUNTER — PATIENT OUTREACH (OUTPATIENT)
Dept: CASE MANAGEMENT | Age: 86
End: 2021-06-17

## 2021-06-17 DIAGNOSIS — Z02.9 ENCOUNTERS FOR ADMINISTRATIVE PURPOSE: ICD-10-CM

## 2021-06-17 PROCEDURE — 1111F DSCHRG MED/CURRENT MED MERGE: CPT

## 2021-06-17 NOTE — PROGRESS NOTES
NCM attempted to contact pt for HFU. No answer and no VM turned on. NCM will try again another time.

## 2021-06-17 NOTE — TELEPHONE ENCOUNTER
HFU scheduled 6/23 at 9:40. Virtual/Telephone Consent    Mikey Mccall verbally {consents to a Virtual/Telephone Check-In service on 6/23/21.   Patient understands and accepts financial responsibility for any deductible, co-insurance and/or co-pa

## 2021-06-18 ENCOUNTER — TELEPHONE (OUTPATIENT)
Dept: INTERNAL MEDICINE CLINIC | Facility: CLINIC | Age: 86
End: 2021-06-18

## 2021-06-18 ENCOUNTER — HOSPITAL ENCOUNTER (OUTPATIENT)
Facility: HOSPITAL | Age: 86
Setting detail: OBSERVATION
Discharge: HOME HEALTH | End: 2021-06-22
Attending: EMERGENCY MEDICINE | Admitting: INTERNAL MEDICINE
Payer: MEDICARE

## 2021-06-18 ENCOUNTER — APPOINTMENT (OUTPATIENT)
Dept: ULTRASOUND IMAGING | Facility: HOSPITAL | Age: 86
End: 2021-06-18
Attending: EMERGENCY MEDICINE
Payer: MEDICARE

## 2021-06-18 ENCOUNTER — APPOINTMENT (OUTPATIENT)
Dept: GENERAL RADIOLOGY | Facility: HOSPITAL | Age: 86
End: 2021-06-18
Attending: EMERGENCY MEDICINE
Payer: MEDICARE

## 2021-06-18 DIAGNOSIS — L03.119 CELLULITIS OF HAND: Primary | ICD-10-CM

## 2021-06-18 DIAGNOSIS — R25.1 TREMOR: ICD-10-CM

## 2021-06-18 DIAGNOSIS — T88.7XXA MEDICATION SIDE EFFECT: ICD-10-CM

## 2021-06-18 PROCEDURE — 93971 EXTREMITY STUDY: CPT | Performed by: EMERGENCY MEDICINE

## 2021-06-18 PROCEDURE — 99220 INITIAL OBSERVATION CARE,LEVL III: CPT | Performed by: INTERNAL MEDICINE

## 2021-06-18 PROCEDURE — 71045 X-RAY EXAM CHEST 1 VIEW: CPT | Performed by: EMERGENCY MEDICINE

## 2021-06-18 RX ORDER — ENOXAPARIN SODIUM 100 MG/ML
40 INJECTION SUBCUTANEOUS DAILY
Status: DISCONTINUED | OUTPATIENT
Start: 2021-06-18 | End: 2021-06-22

## 2021-06-18 RX ORDER — METOCLOPRAMIDE HYDROCHLORIDE 5 MG/ML
10 INJECTION INTRAMUSCULAR; INTRAVENOUS EVERY 8 HOURS PRN
Status: DISCONTINUED | OUTPATIENT
Start: 2021-06-18 | End: 2021-06-22

## 2021-06-18 RX ORDER — QUETIAPINE 25 MG/1
25 TABLET, FILM COATED ORAL NIGHTLY
Status: DISCONTINUED | OUTPATIENT
Start: 2021-06-18 | End: 2021-06-22

## 2021-06-18 RX ORDER — ENALAPRIL MALEATE 10 MG/1
20 TABLET ORAL DAILY
Status: DISCONTINUED | OUTPATIENT
Start: 2021-06-18 | End: 2021-06-22

## 2021-06-18 RX ORDER — QUETIAPINE 25 MG/1
25 TABLET, FILM COATED ORAL NIGHTLY
Status: DISCONTINUED | OUTPATIENT
Start: 2021-06-18 | End: 2021-06-21 | Stop reason: HOSPADM

## 2021-06-18 RX ORDER — ACETAMINOPHEN 325 MG/1
650 TABLET ORAL EVERY 6 HOURS PRN
Status: DISCONTINUED | OUTPATIENT
Start: 2021-06-18 | End: 2021-06-22

## 2021-06-18 RX ORDER — ONDANSETRON 2 MG/ML
4 INJECTION INTRAMUSCULAR; INTRAVENOUS EVERY 6 HOURS PRN
Status: DISCONTINUED | OUTPATIENT
Start: 2021-06-18 | End: 2021-06-22

## 2021-06-18 RX ORDER — GARLIC EXTRACT 500 MG
1 CAPSULE ORAL DAILY
COMMUNITY

## 2021-06-18 RX ORDER — KETOROLAC TROMETHAMINE 30 MG/ML
15 INJECTION, SOLUTION INTRAMUSCULAR; INTRAVENOUS ONCE
Status: COMPLETED | OUTPATIENT
Start: 2021-06-18 | End: 2021-06-18

## 2021-06-18 NOTE — ED INITIAL ASSESSMENT (HPI)
Patient recently started on Levaquin for pneumonia. Since starting has been experiencing tremors. PCP wanted patient checked out.

## 2021-06-18 NOTE — TELEPHONE ENCOUNTER
[facility] called and sated that the pt was at the hospital due to pneumonia. When the pt was discharged they had been given 7 tablets of  levofloxacin 500 MG Oral Tab.   Natali Fuller from [facility] stated that the pt is now experiencing tremors, has halluci

## 2021-06-18 NOTE — TELEPHONE ENCOUNTER
Spoke with Mirza Mendez from [facility] who is concerned about the patient having a reaction to her new medication (Levofloxcin). Mirza Mendez stated the patient developed tremors yesterday after taking her medication.    The second dose was given today, Mirza Mendez noted incre

## 2021-06-18 NOTE — PROGRESS NOTES
Initial Post Discharge Follow Up   Discharge Date: 6/16/21  Contact Date: 6/17/2021    Consent Verification:  Assessment Completed With: Patient  HIPAA Verified?   Yes    Discharge Dx:   Hypoxia    Was TCC ordered: no      General:   • How have you been No  • Did you  your discharge medications when you left the hospital? Yes  • May I go over your medications with you to make sure we are not missing anything? yes  • Are there any reasons that keep you from taking your medication as prescribed?  No  A If you believe this is an emergency, please dial 911 immediately.               [address] [place]  [address]  [address] [place] [address] [phone]          PCP TC to PCP.

## 2021-06-18 NOTE — ED PROVIDER NOTES
Patient Seen in: [facility] Emergency Department      History   Patient presents with:  Numbness Weakness    Stated Complaint: tremors, on levaquin    HPI/Subjective:   HPI    70-year-old female admitted to this hospital 4 days ago and discharged 1 adenomyomatosis   • Gastritis 3/3/2005    mild diffuse   • Glaucoma 5/2/2012    \"open angle\" 2017   • Head injury 1949   • Hearing loss    • Hemoglobin disorder (HCC)     Borderline Hgb A1C   • Hiatal hernia 3/3/2005    small   • History of bone density reviewed. All other systems reviewed and negative except as noted above.     Physical Exam     ED Triage Vitals [06/18/21 1622]   BP (!) 173/84   Pulse 110   Resp 18   Temp 98.9 °F (37.2 °C)   Temp src Temporal   SpO2 92 %   O2 Device None (Room air) RAPID SARS-COV-2 BY PCR - Normal   CBC WITH DIFFERENTIAL WITH PLATELET    Narrative: The following orders were created for panel order CBC With Differential With Platelet.   Procedure                               Abnormality         Status nonspecific small cortical lucencies are noted the calvarium. No skull fracture is identified. OTHER:             None. CONCLUSION:  1.  Sequelae of chronic small vessel ischemic disease is again identified along with a stable old appearing tiny HISTORY: (As transcribed by Technologist)  Tremors. FINDINGS:  Normal heart size and pulmonary vascularity. Tortuous and calcified aorta. No focal pulmonary opacity. Stable mild blunting of left costophrenic angle.   Scoliosis and degenerative changes morning, noted dizziness this afternoon. FINDINGS:  Suboptimal examination secondary to kyphotic technique. Cardiac silhouette is normal in size. Aortic atherosclerosis. Mild bibasilar atelectasis and/or scarring.   Mild blunting left costophrenic an Prescribed:  Current Discharge Medication List                          Hospital Problems     Present on Admission  Date Reviewed: 11/11/2020        ICD-10-CM Noted POA    * (Principal) Cellulitis of hand L03.119 6/18/2021 Unknown    Benign essential HTN I

## 2021-06-19 ENCOUNTER — APPOINTMENT (OUTPATIENT)
Dept: CT IMAGING | Facility: HOSPITAL | Age: 86
End: 2021-06-19
Attending: Other
Payer: MEDICARE

## 2021-06-19 PROCEDURE — 99223 1ST HOSP IP/OBS HIGH 75: CPT | Performed by: OTHER

## 2021-06-19 PROCEDURE — 99225 SUBSEQUENT OBSERVATION CARE: CPT | Performed by: INTERNAL MEDICINE

## 2021-06-19 PROCEDURE — 70450 CT HEAD/BRAIN W/O DYE: CPT | Performed by: OTHER

## 2021-06-19 RX ORDER — VANCOMYCIN HYDROCHLORIDE 125 MG/1
125 CAPSULE ORAL DAILY
Status: DISCONTINUED | OUTPATIENT
Start: 2021-06-19 | End: 2021-06-20

## 2021-06-19 NOTE — CONSULTS
[id number] [facility] Consultation Note    Date of consult: 6/19/2021    Reason for consult: altered mental status    HPI: Dustin Ball is a 80year old female with PMHx of HTN, recent PNA presents with tremors and confusion.  Pt was just a Comment:Preservatives-intestinal upset  Hydromorphone             Latex                   ITCHING  Novocain                    Comment:Reaction: HA's, GI  Nsaids                      Comment:Reaction: GI  Patanase                    Comment:Reaction: SOB mammogram 6/29/2012   • Palpitations    • Pre-syncope 3/12/2012   • Rapid heart beat 2003   • Rheumatic fever    • RUQ pain    • Sinusitis    • SOB (shortness of breath)    • SSS (sick sinus syndrome) [facility] 3/5/2012    early SSS   • Tachycardia    • Uncons cellulitis  Neurological Examination:  Language: Fluency with normal naming and repetition, comprehension normal  Speech: no dysarthria  CN: II-XII    pupils 2-3 mm equal and reactive to light  III, IV, VI extraocular movements intact, no nystagmus, no pto Pasha Matos MD   Neurology  [facility]  6/19/2021, 9:09 AM  CC: Jo Ann Capps MD

## 2021-06-19 NOTE — CM/SW NOTE
MSW reviewed chart. Pt is a readmit-hm with spouse. Updates (orders sent) sent to [facility] Phone: [phone]. She also had a katiuska set up at last admission. Message left for Pt's Dtr and Junius Range [phone].

## 2021-06-19 NOTE — PLAN OF CARE
NURSING ADMISSION NOTE      Patient admitted via cart from ER accompanied by daughter. LUE swelling and redness noted. A/O VSS. Oriented to room. Safety precautions initiated. Bed in low position. Call light in reach. Bed alarm on.

## 2021-06-19 NOTE — PROGRESS NOTES
LUIS MANUEL HOSPITALIST  Progress Note     Brain Laurens Patient Status:  Observation    3/15/1930 MRN [id number]   [facility] 3SW-A Attending Mortimer Bullocks, [address] [facility] Day # 0 PCP rAaceli Alanis MD     Chief Complaint: Tremors, confusion CREATSERUM 0.75   < > 0.58  --   --  0.70 0.53*   GFRAA 81   < > 93  --   --  88 96   GFRNAA 70   < > 81  --   --  76 83   CA 8.7   < > 8.6  --   --  8.7 8.1*   ALB 2.6*  --   --   --   --  2.5*  --       < > 139  --   --  135* 135*   K 3.8   < > 3 weeks    Quality:  · DVT Prophylaxis: Lovenox  · CODE status: Full  · Yadav: No  · Central line: No    Will the patient be referred to TCC on discharge?: No  Estimated date of discharge: TBD  Discharge is dependent on: Clinical status  At this point Ms. Lajune Rubinstein

## 2021-06-19 NOTE — PLAN OF CARE
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

## 2021-06-20 PROCEDURE — 99225 SUBSEQUENT OBSERVATION CARE: CPT | Performed by: INTERNAL MEDICINE

## 2021-06-20 PROCEDURE — 99232 SBSQ HOSP IP/OBS MODERATE 35: CPT | Performed by: OTHER

## 2021-06-20 RX ORDER — VANCOMYCIN HYDROCHLORIDE 125 MG/1
125 CAPSULE ORAL EVERY 6 HOURS
Status: DISCONTINUED | OUTPATIENT
Start: 2021-06-20 | End: 2021-06-22

## 2021-06-20 NOTE — PLAN OF CARE
ED admit 6/18 Lt hand cellulitis + reported new onset tremors and confusion after started PO Levaquin, VSS, Pt is AAOX3-4, forgetful at times, isolation precautions maintained for new positive C-Diff, reported to have newer onset dementia prior to admit, P

## 2021-06-20 NOTE — PROGRESS NOTES
[address] [facility] Progress Note    Radha Meredith Patient Status:  Observation    3/15/1930 MRN [id number]   [facility] 3SW-A Attending Farzaneh Ramey,    Hosp Day # 0 PCP Destni Renae MD     CC:  DULCE    Subjective:  Patr · Recent C-diff    Patient with metabolic encephalopathy. Confusion better this AM, and patient thinks her tremors are also better. Await EEG. Continue to treat cellulitis. Will discuss with Dr. Marianna Simmons.     DUSTY Ruggiero  THE [facility]

## 2021-06-20 NOTE — PLAN OF CARE
Pt Aox3, forgetful at times. R.A. Pain controlled with tylenol PRN. Pt currently not ambulating; turning Q2. VS remain stable, voiding via purwick and brief, last BM 6/19/2021. SCDs bilaterally, ankle pumps encouraged. IS encouraged. Tolerating IV abx.  Pt

## 2021-06-20 NOTE — PROGRESS NOTES
LUIS MANUEL HOSPITALIST  Progress Note     Cedric Hemp Patient Status:  Observation    3/15/1930 MRN [id number]   [place] [facility] 3SW-A Attending Taya Ferrara, [address] [facility] Day # 0 PCP Dada Granados MD     Chief Complaint: Tremors, confusion 0.75   < > 0.58  --   --  0.70 0.53*   GFRAA 81   < > 93  --   --  88 96   GFRNAA 70   < > 81  --   --  76 83   CA 8.7   < > 8.6  --   --  8.7 8.1*   ALB 2.6*  --   --   --   --  2.5*  --       < > 139  --   --  135* 135*   K 3.8   < > 3.5   < > 4.0 continue rocephin as above  7. Ess HTN  1. Continue home meds  8. Profound deconditioning  1. Son to move in tmrw to be with her for next 5 weeks    Patient has had FTT recently. Will consult palliative care tomorrow if family agreeable to discuss Dk 64.

## 2021-06-20 NOTE — OCCUPATIONAL THERAPY NOTE
OT orders received, chart reviewed.  Patient with recent admission earlier this month where it was determined through discussions with patient and daughter that patient was at her functional baseline of total assist for BADLs and functional transfers, careg

## 2021-06-20 NOTE — PHYSICAL THERAPY NOTE
PT orders received and chart reviewed. Pt is a re-admit and per chart review and discussion with RN pt is at her functional baseline. Pt has all needed assist and equipment at home. PT will sign off.

## 2021-06-21 ENCOUNTER — NURSE ONLY (OUTPATIENT)
Dept: ELECTROPHYSIOLOGY | Facility: HOSPITAL | Age: 86
End: 2021-06-21
Attending: NURSE PRACTITIONER
Payer: MEDICARE

## 2021-06-21 PROCEDURE — 99233 SBSQ HOSP IP/OBS HIGH 50: CPT | Performed by: OTHER

## 2021-06-21 PROCEDURE — 95819 EEG AWAKE AND ASLEEP: CPT | Performed by: OTHER

## 2021-06-21 PROCEDURE — 99225 SUBSEQUENT OBSERVATION CARE: CPT | Performed by: INTERNAL MEDICINE

## 2021-06-21 RX ORDER — VANCOMYCIN HYDROCHLORIDE 125 MG/1
125 CAPSULE ORAL EVERY 6 HOURS
Qty: 52 CAPSULE | Refills: 0 | Status: SHIPPED | OUTPATIENT
Start: 2021-06-21 | End: 2021-06-25

## 2021-06-21 RX ORDER — CEFDINIR 300 MG/1
300 CAPSULE ORAL 2 TIMES DAILY
Qty: 10 CAPSULE | Refills: 0 | Status: SHIPPED | OUTPATIENT
Start: 2021-06-21 | End: 2021-06-25

## 2021-06-21 NOTE — PALLIATIVE CARE NOTE
Palliative care team consulted to assist with Goals of care discussion. Family meeting scheduled for 12.30 pm tomorrow.      Tad Alexander MD

## 2021-06-21 NOTE — PLAN OF CARE
ED admit 6/18 Lt hand cellulitis + reported new onset tremors and confusion after started PO Levaquin, VSS, Pt is AAOX3-4, forgetful at times, mentation better today, isolation precautions maintained for positive C-Diff, voiding, incontinent, to purewick,

## 2021-06-21 NOTE — PROGRESS NOTES
LUIS MANUEL HOSPITALIST  Progress Note     Morteza Weston Patient Status:  Observation    3/15/1930 MRN [id number]   [facility] 3SW-A Attending Bernard Joshua, [address] [facility] Day # 0 PCP Jo Ann Capps MD     Chief Complaint: Tremors, confusion TP 5.9*  --   --      Estimated Creatinine Clearance: 55.5 mL/min (based on SCr of 0.57 mg/dL). No results for input(s): PTP, INR in the last 168 hours. No results for input(s): TROP, CK in the last 168 hours.      Imaging: Imaging data reviewed in neuro recs  At this point Ms. Toribio Challenger is expected to be discharge to: Home    Plan of care discussed with patient, RN.      Dawson Farrell, DO

## 2021-06-21 NOTE — PLAN OF CARE
Pt Aox3, forgetful at times. Tremors in hands present, but per pt improved, tremors to L Hand worse than R. R.A. Pain to Left Hand moderately relieved with PRN tylenol. Left Hand w/ pitting edema, and erethyma. Hand elevated on pillows as tolerated.  Pt cur

## 2021-06-21 NOTE — PROCEDURES
[address] EEG report    Name: Fior Becerra    Date of Study: 6/21/2021      Routine EEG Report    Ordering Physician: Marin Jalloh MD                             Primary Care Physician: Latisha Goodrich MD    Technical A closure and eye opening    Slowing: intermittent, diffuse,  uV, 2-3 Hz delta slowing was noted, maximal over the bifrontal regions (FP2/F4, Fp1/F3)    Sleep: Sleep stage 1 was noted, characterized by the appearance of vertex waves.      Hyperventilati

## 2021-06-21 NOTE — PROGRESS NOTES
[address] [facility] Progress Note    Villa Harjeet Patient Status:  Observation    3/15/1930 MRN [id number]   [facility] 3SW-A Attending Radhames Arita, [address] [facility] Day # 0 PCP Germaine Jean MD       Chief Complaint:     AMS mg Oral Nightly   • QUEtiapine Fumarate  12.5 mg Oral QAM   • enoxaparin  40 mg Subcutaneous Daily   • cefTRIAXone  1 g Intravenous Q24H       Patient Active Problem List:     Osteoarthritis, multiple sites     Lumbar spinal stenosis     Cervical spine deg past 24 hrs:   BP Temp Temp src Pulse Resp SpO2   06/21/21 0800 136/53 98 °F (36.7 °C) Oral 75 18 99 %   06/21/21 0336 152/69 98.2 °F (36.8 °C) Oral 96 18 97 %   06/20/21 2351 139/53 98 °F (36.7 °C) Oral 93 16 97 %   06/20/21 1954 123/47 97.7 °F (36.5 °C) Lab Results   Component Value Date    CHOLEST 171 10/23/2020    CHOLEST 188 06/14/2018    CHOLEST 190 08/15/2017     Lab Results   Component Value Date    HDL 73 (H) 10/23/2020    HDL 59 06/14/2018    HDL 68 08/15/2017     Lab Results   Component Value D

## 2021-06-22 ENCOUNTER — PATIENT OUTREACH (OUTPATIENT)
Dept: CASE MANAGEMENT | Age: 86
End: 2021-06-22

## 2021-06-22 VITALS
WEIGHT: 170 LBS | SYSTOLIC BLOOD PRESSURE: 158 MMHG | TEMPERATURE: 98 F | HEART RATE: 94 BPM | BODY MASS INDEX: 29.02 KG/M2 | DIASTOLIC BLOOD PRESSURE: 54 MMHG | OXYGEN SATURATION: 96 % | HEIGHT: 64 IN | RESPIRATION RATE: 16 BRPM

## 2021-06-22 PROCEDURE — G2212 PROLONG OUTPT/OFFICE VIS: HCPCS | Performed by: INTERNAL MEDICINE

## 2021-06-22 PROCEDURE — 99217 OBSERVATION CARE DISCHARGE: CPT | Performed by: INTERNAL MEDICINE

## 2021-06-22 PROCEDURE — 99215 OFFICE O/P EST HI 40 MIN: CPT | Performed by: INTERNAL MEDICINE

## 2021-06-22 NOTE — PROGRESS NOTES
[address] [facility] 149 Follow Up    Collins aPniagua Patient Status:  Observation    3/15/1930 MRN [id number]   [facility] 3SW-A Attending Ashtyn Delgado, [address] [facility] Day # 0 PCP Cameron Sadler MD     Subjective:  Collin Wallace resuscitated if she will be worse off than now. She does not want to be intubated. She wants to be able to come to hospital for acute needs. Code status changed to DNAR/select. POLST form completed. --provided active listening and emotional support. 6 (six) hours for 13 days. cefdinir 300 MG Oral Cap, Take 1 capsule (300 mg total) by mouth 2 (two) times daily for 5 days.         Hematology:  Lab Results   Component Value Date    WBC 8.9 06/20/2021    HGB 9.6 (L) 06/20/2021    HCT 28.7 (L) 06/20/2021 her Goal is to be able to walk again. Discussed that pt has not been able to walk since January and I do not anticipate that she will be able to walk again. [facility] lift was recently delivered to pt's home.    --Code status changed to DNAR/select after discus

## 2021-06-22 NOTE — PLAN OF CARE
DISCHARGE INSTRUCTIONS GIVEN TO PT AND SONS. SCRIPT FOR ANTIBIOTICS FILLED HERE. PT WILL BE  WITH AMBULANCE. SONS ABLE TO FOLLOW INSTRUCTIONS

## 2021-06-22 NOTE — CONSULTS
[address] [address] Patient Status:  Observation    3/15/1930 MRN [id number]   [facility] 3SW-A Attending Taya Ferrara DO   Hosp Day # 0 PCP Dada Granados MD     Date of Consult: 20 hospitalization pt has not been able to do so and [employer] lift was delivered to home last week. Pt has caregivers who come for 3-4 hours is morning and evening. Family asked if pt will need 24 hr supervision.  We discussed that pt lives with her 80 year husba Prior to Admit: lives at home with her    Does Patient Live Alone: no   Does Patient have Legal Guardian: no   Is Patient Confused: no       Past Medical History/Past Surgical History: History obtained from [facility].    This is the second hospitalization 3/12/2012   • Rapid heart beat 2003   • Rheumatic fever    • RUQ pain    • Sinusitis    • SOB (shortness of breath)    • SSS (sick sinus syndrome) ([facility].) 3/5/2012    early SSS   • Tachycardia    • Unconscious ([facility].) 1949   • Unspecified essential hypertensio Can't do any work      Mainly Assist    Normal or reduced     Full or confused                                  Extensive Disease     30   Bed Bound       Can't do any work      Max Assist        Reduced                    Drowsy/confused HCl (ZOFRAN) injection 4 mg, 4 mg, Intravenous, Q6H PRN  •  Metoclopramide HCl (REGLAN) injection 10 mg, 10 mg, Intravenous, Q8H PRN  •  cefTRIAXone Sodium (ROCEPHIN) 1 g in sodium chloride 0.9% 100 mL IVPB-ADDV, 1 g, Intravenous, Q24H  vancomycin HCl 125 Performance Scale :40  %          Healthcare Agent Appointed: Yes  Healthcare Agent's Name: [id number] [address] Agent's Phone Number: [phone]          Spiritual needs addressed: Referral placed for Spiritual Care    Disposition: home palliati

## 2021-06-22 NOTE — PLAN OF CARE
Received report on patient and patient care responsibilities at approx 0000 6/22. Pt VSS, Aox4; forgetful (resolves with rest and reminders).  Pt on Regular diet; writing RN has not witnessed pt eating food but has witnessed sips of water; tolerates well w/

## 2021-06-22 NOTE — PROGRESS NOTES
Son declined scheduling at this time pt is bed ridden and he stated they spoke to  about what is going on with pt, will call if they decide to schedule

## 2021-06-22 NOTE — PROGRESS NOTES
LUIS MANUEL HOSPITALIST  Progress Note     Omero Jobs Patient Status:  Observation    3/15/1930 MRN [id number]   [facility] 3SW-A Attending Ko White, 160[address] [facility] Day # 0 PCP Asmita Blanco MD     Chief Complaint: Tremors, confusion Estimated Creatinine Clearance: 55.5 mL/min (based on SCr of 0.57 mg/dL). No results for input(s): PTP, INR in the last 168 hours. No results for input(s): TROP, CK in the last 168 hours. Imaging: Imaging data reviewed in Epic.     Medications No  Estimated date of discharge: Today  Discharge is dependent on: Clinical status, neuro recs  At this point Ms. Manny Martinez is expected to be discharge to: Home    Plan of care discussed with patient, RN.      Cira Barnhart, DO

## 2021-06-22 NOTE — CM/SW NOTE
06/22/21 1400   CM/SW Referral Data   Referral Source Physician   Reason for Referral Discharge planning   Informant Patient; Children;Edward Staff   Patient Info   Patient's Mental Status Alert;Memory Impairments   Patient's Home Environment House   Num services. She will contact pt's son, Nasir Gilliland to discuss. Contacted [facility] and confirmed they can resume services. Informed them of DC today. Ambulance transport arranged for 4pm.  PCS form completed and available for RN to print. Updated pt's RN.   No

## 2021-06-22 NOTE — PROGRESS NOTES
Residential Palliative Liaison received palliative referral for community PC services. Waiting to obtain insurance (verification/authorization) prior to pursuing.          Dimitri Carbajal  Residential Palliative Liaison   [phone]

## 2021-06-22 NOTE — DISCHARGE SUMMARY
[facility] HOSPITALIST  DISCHARGE SUMMARY     Patti Xavier Patient Status:  Observation    3/15/1930 MRN [id number]   [facility] 3SW-A Attending Abi Goodman, DO   Hosp Day # 0 PCP Jean Gomez MD     Date of Admission: 2021  Date was discharged on p.o. Vanco for C. difficile and a short course of Omnicef to finish a course of antibiotics for left hand cellulitis.     Lace+ Score: 67  59-90 High Risk  29-58 Medium Risk  0-28   Low Risk       TCM Follow-Up Recommendation:  LACE > 58: Bring a paper prescription for each of these medications  · cefdinir 300 MG Caps  · vancomycin HCl 125 MG Caps         [place] substances prescribed at discharge.     Follow-up appointment:   Geovanni Cleveland MD  [facility]

## 2021-06-22 NOTE — PROGRESS NOTES
1st attempt PCP apt request    [address] Santana Burleson, [address]  [phone]    Unable to contact patients son Nasir Gilliland. M for CB.

## 2021-06-24 ENCOUNTER — PATIENT OUTREACH (OUTPATIENT)
Dept: CASE MANAGEMENT | Age: 86
End: 2021-06-24

## 2021-06-24 ENCOUNTER — TELEPHONE (OUTPATIENT)
Dept: INTERNAL MEDICINE CLINIC | Facility: CLINIC | Age: 86
End: 2021-06-24

## 2021-06-24 DIAGNOSIS — L03.119 CELLULITIS OF HAND: ICD-10-CM

## 2021-06-24 DIAGNOSIS — Z02.9 ENCOUNTERS FOR ADMINISTRATIVE PURPOSE: ICD-10-CM

## 2021-06-24 PROCEDURE — 1111F DSCHRG MED/CURRENT MED MERGE: CPT

## 2021-06-24 RX ORDER — ACETAMINOPHEN 325 MG/1
325 TABLET ORAL EVERY 6 HOURS PRN
COMMUNITY
End: 2021-06-25

## 2021-06-24 NOTE — PROGRESS NOTES
[facility] called preferred number and daughter Doretha Quigley answered. She stated that she was at work and to call patient's home #. NCM called patient's home number and s/w patient. Patient states that this was not a good time and to call back later.  [facility] will call becky

## 2021-06-24 NOTE — TELEPHONE ENCOUNTER
Virtual/Telephone Consent    Heena Gupta verbally {consents to a Virtual/Telephone Check-In service on 6/25/21. Patient understands and accepts financial responsibility for any deductible, co-insurance and/or co-pays associated with this service.

## 2021-06-24 NOTE — PROGRESS NOTES
Initial Post Discharge Follow Up   Discharge Date: 6/22/21  Contact Date: 6/24/2021    Consent Verification:  Assessment Completed With: Patient  Other: son Rosita Vergara received per patient?  verbal Both were on the phone call  HIPAA Verified?   Yes mouth daily. • Enalapril Maleate 20 MG Oral Tab Take 20 mg by mouth daily. • QUEtiapine Fumarate 25 MG Oral Tab Take 25 mg by mouth nightly. • QUEtiapine Fumarate 25 MG Oral Tab Take 12.5 mg by mouth every morning.      • Latanoprostene Bunod (V microphone are working properly. Once the video visit has started you will be placed in a waiting room until the provider begins the visit. You will receive an email confirmation with instructions.   If you have questions, call your doctor's office dire improved from yesterday to today. Pt is keeping arm elevated as much as she can. He states that the redness has also improved greatly and the color looks almost normal. NCM instructed on s/s of infection.  Pt denies any wounds or new wounds on left hand or

## 2021-06-25 ENCOUNTER — TELEMEDICINE (OUTPATIENT)
Dept: INTERNAL MEDICINE CLINIC | Facility: CLINIC | Age: 86
End: 2021-06-25

## 2021-06-25 VITALS
BODY MASS INDEX: 29 KG/M2 | HEIGHT: 64 IN | HEART RATE: 89 BPM | TEMPERATURE: 98 F | DIASTOLIC BLOOD PRESSURE: 60 MMHG | SYSTOLIC BLOOD PRESSURE: 110 MMHG

## 2021-06-25 DIAGNOSIS — R60.0 EDEMA OF LEFT UPPER ARM: ICD-10-CM

## 2021-06-25 DIAGNOSIS — D64.9 ANEMIA, UNSPECIFIED TYPE: ICD-10-CM

## 2021-06-25 DIAGNOSIS — R21 GROIN RASH: ICD-10-CM

## 2021-06-25 DIAGNOSIS — L03.114 CELLULITIS OF LEFT HAND: Primary | ICD-10-CM

## 2021-06-25 DIAGNOSIS — R25.1 TREMOR: ICD-10-CM

## 2021-06-25 DIAGNOSIS — R53.81 PHYSICAL DECONDITIONING: ICD-10-CM

## 2021-06-25 DIAGNOSIS — L89.151 PRESSURE INJURY OF SACRAL REGION, STAGE 1: ICD-10-CM

## 2021-06-25 DIAGNOSIS — A04.72 C. DIFFICILE COLITIS: ICD-10-CM

## 2021-06-25 DIAGNOSIS — R41.82 ALTERED MENTAL STATUS, UNSPECIFIED ALTERED MENTAL STATUS TYPE: ICD-10-CM

## 2021-06-25 PROCEDURE — 99496 TRANSJ CARE MGMT HIGH F2F 7D: CPT | Performed by: INTERNAL MEDICINE

## 2021-06-25 RX ORDER — CEFDINIR 300 MG/1
300 CAPSULE ORAL 2 TIMES DAILY
Qty: 14 CAPSULE | Refills: 0 | Status: SHIPPED | OUTPATIENT
Start: 2021-06-25 | End: 2021-07-14 | Stop reason: ALTCHOICE

## 2021-06-25 RX ORDER — VANCOMYCIN HYDROCHLORIDE 125 MG/1
125 CAPSULE ORAL EVERY 6 HOURS
Qty: 28 CAPSULE | Refills: 0 | Status: SHIPPED | OUTPATIENT
Start: 2021-06-25 | End: 2021-07-14 | Stop reason: ALTCHOICE

## 2021-06-25 RX ORDER — ACETAMINOPHEN 500 MG
500 TABLET ORAL EVERY 6 HOURS PRN
COMMUNITY

## 2021-06-25 NOTE — PROGRESS NOTES
Virtual Telephone Check-In    Vijaya Dobbs verbally consents to a Virtual/Telephone Check-In visit on 06/25/21. Patient has been referred to the [facility] website at [email or web address] to review the yearly Consent to Treat document.     Patient un home health nurse also noticed some L UE swelling and redness.      Brief Synopsis: Patient recently discharged from the hospital for pneumonia and had been taking Levaquin. Family noticed new tremors which may be due to medication side effect.   She also still having diarrhea. She is wearing diapers. She has a stage 1 pressure ulcer per HHRN. No break of skin yet. She also has a groin rash which looks like a diaper rash, likely from being in a diaper and sometimes having stool in that area.  Does no Size: adult)   Pulse 89   Temp 97.6 °F (36.4 °C) (Temporal)   Ht 5' 4\" (1.626 m)   BMI 29.18 kg/m²    GENERAL: alert, in bed. No distress. EYES:no scleral icterus. LUNGS: no labored breathing, no dyspnea.    SKIN: left hand with some swelling on the do Glucose 98 70 - 99 mg/dL    Sodium 135 (L) 136 - 145 mmol/L    Potassium 3.5 3.5 - 5.1 mmol/L    Chloride 105 98 - 112 mmol/L    CO2 24.0 21.0 - 32.0 mmol/L    Anion Gap 6 0 - 18 mmol/L    BUN 8 7 - 18 mg/dL    Creatinine 0.53 (L) 0.55 - 1.02 mg/dL    BUN/ 450.0 10(3)uL    MCV 96.2 80.0 - 100.0 fL    MCH 32.9 26.0 - 34.0 pg    MCHC 34.2 31.0 - 37.0 g/dL    RDW 14.5 11.0 - 15.0 %    RDW-SD 49.8 (H) 35.1 - 46.3 fL    Neutrophil Absolute Prelim 9.47 (H) 1.50 - 7.70 x10 (3) uL    Neutrophil Absolute 9.47 (H) 1.5 6. 00 1.50 - 7.70 x10 (3) uL    Neutrophil Absolute 6.00 1.50 - 7.70 x10(3) uL    Lymphocyte Absolute 1.47 1.00 - 4.00 x10(3) uL    Monocyte Absolute 1.06 (H) 0.10 - 1.00 x10(3) uL    Eosinophil Absolute 0.20 0.00 - 0.70 x10(3) uL    Basophil Absolute 0.08 multiple concerns like her. Son is in agreement and they will work on this. As far as I know being on palliative care should not mean they cannot have Home health. Palliative care is for comfort and pain control. She is not on hospice yet.  They will clar

## 2021-07-12 LAB
AMB EXT HEMATOCRIT: 32.1
AMB EXT HEMOGLOBIN: 10.8
AMB EXT MCV: 101.2
AMB EXT PLATELETS: 284
AMB EXT WBC: 6.1 X10(3)UL

## 2021-07-13 ENCOUNTER — TELEPHONE (OUTPATIENT)
Dept: INTERNAL MEDICINE CLINIC | Facility: CLINIC | Age: 86
End: 2021-07-13

## 2021-07-13 NOTE — TELEPHONE ENCOUNTER
Pt has VV tomorrow  Will fax both notes together once VV notes are ready for 7/14/21    Please fax progress notes to [phone].    If any questions call [phone].

## 2021-07-13 NOTE — TELEPHONE ENCOUNTER
Received results for the following tests: CBC completed on 7/12/21 at [address] [place] [address]  Updated Ext Result Console as able. To Dr. Brenna Guy for review.

## 2021-07-13 NOTE — TELEPHONE ENCOUNTER
Incoming (mail or fax):  fax  Received from:  [facility]  Documentation given to:  Triage - Dr Matt loving

## 2021-07-13 NOTE — TELEPHONE ENCOUNTER
Gladis from [facility] is calling and would like us to fax over the pt progress notes from her 6/25/21 visit and her progress notes from her video visit that will be tomorrow at 11:40am on 7/14/21. Please fax progress notes to [phone].  If any qu

## 2021-07-14 ENCOUNTER — TELEMEDICINE (OUTPATIENT)
Dept: INTERNAL MEDICINE CLINIC | Facility: CLINIC | Age: 86
End: 2021-07-14
Payer: MEDICARE

## 2021-07-14 ENCOUNTER — PATIENT MESSAGE (OUTPATIENT)
Dept: INTERNAL MEDICINE CLINIC | Facility: CLINIC | Age: 86
End: 2021-07-14

## 2021-07-14 VITALS
BODY MASS INDEX: 29 KG/M2 | DIASTOLIC BLOOD PRESSURE: 64 MMHG | HEIGHT: 64 IN | OXYGEN SATURATION: 96 % | SYSTOLIC BLOOD PRESSURE: 126 MMHG

## 2021-07-14 DIAGNOSIS — R45.1 AGITATION: ICD-10-CM

## 2021-07-14 DIAGNOSIS — I10 ESSENTIAL HYPERTENSION: ICD-10-CM

## 2021-07-14 DIAGNOSIS — A04.72 C. DIFFICILE COLITIS: ICD-10-CM

## 2021-07-14 DIAGNOSIS — L03.114 CELLULITIS OF LEFT HAND: Primary | ICD-10-CM

## 2021-07-14 DIAGNOSIS — R53.81 PHYSICAL DECONDITIONING: ICD-10-CM

## 2021-07-14 DIAGNOSIS — R41.0 CONFUSION: ICD-10-CM

## 2021-07-14 DIAGNOSIS — H53.9 VISION CHANGES: ICD-10-CM

## 2021-07-14 PROCEDURE — 99215 OFFICE O/P EST HI 40 MIN: CPT | Performed by: INTERNAL MEDICINE

## 2021-07-14 NOTE — PROGRESS NOTES
Virtual Telephone Check-In    Nicolelula Wellington verbally consents to a Virtual/Telephone Check-In visit on 07/14/21. Patient has been referred to the [facility] website at [email or web address] to review the yearly Consent to Treat document.     Patient un Outpatient Medications   Medication Sig Dispense Refill   • acetaminophen 500 MG Oral Tab Take 500 mg by mouth every 6 (six) hours as needed for Pain. • Acidophilus/Pectin Oral Cap Take 1 capsule by mouth daily.      • Enalapril Maleate 20 MG Oral Tab T the resistance. If worsens she can go back to the 25mg dose. 5. Physical deconditioning  Again had a discussion with pt regarding working with PT to try to improve her activity and trying to get out of bed.    Also discussed with pt the need to have toby labs, medications, radiology tests and decision making. Appropriate medical decision-making and tests are ordered as detailed in the plan of care above.      40 minutes spent on provision of medical services today, including chart review, obtaining and rev

## 2021-07-14 NOTE — PATIENT INSTRUCTIONS
Trial of decreased dose of seroquel at night. Can try to decrease to 12.5mg to see if that helps her night time symptoms and allows her to go to bed on time. If no improvement or if worsening symptoms then okay to go back to 25mg nightly.    She needs neuro

## 2021-07-14 NOTE — TELEPHONE ENCOUNTER
Spoke with Julian Harrell RN at [address] [place] [address]  Notified to have pt be seen by neuropsychiatry or psychiatry who may be able to visit with pt at home and assess for dementia and manage her serolatonyal    KHAI Harrell stated pt was seen by Dr. Paola Duque on 7/7/21  He does home

## 2021-07-14 NOTE — TELEPHONE ENCOUNTER
Reviewed at visit today. Please check with home health to see if they have neuropsychiatry or psychiatry who may be able to visit with pt at home and assess for dementia and manage her seroquel. If so I would like this done.

## 2021-07-15 NOTE — TELEPHONE ENCOUNTER
There is nothing that we can advise with the family member on here. If it is recommended for them to see neuropsych, that recommendation is not going to change. If they need to get medical transport to get the patient there then they need to arrange it.

## 2021-07-15 NOTE — TELEPHONE ENCOUNTER
See pt's MyChart message by daughter Geovanna Zamora response to AVT instructions. Please advise, thanks! Noted in AVS instructions: \"Trial of decreased dose of seroquel at night.  Can try to decrease to 12.5mg to see if that helps her night time symptoms  and

## 2021-07-15 NOTE — TELEPHONE ENCOUNTER
From: Patti Xavier  To: Jean Gomez MD  Sent: 7/14/2021 8:41 PM CDT  Subject: Visit Follow-up Question    Hi I read the after visit notes from today’s evisit. My brother Anton Li is staying at my parents temporarily.  He has helped my mom with the belem

## 2021-07-16 ENCOUNTER — TELEPHONE (OUTPATIENT)
Dept: INTERNAL MEDICINE CLINIC | Facility: CLINIC | Age: 86
End: 2021-07-16

## 2021-07-16 NOTE — TELEPHONE ENCOUNTER
Incoming (mail or fax):  fax  Received from:  [address] given to:  Triage In Basket    Needs to be signed

## 2021-07-16 NOTE — TELEPHONE ENCOUNTER
Noted below. Please thank them for the update. I will leave the decision regarding the neuropsych testing up to the family, I am not sure that it is that critical at this time.    She may need some support from psychiatry based on discussion with Mani Pedraza reg

## 2021-07-19 NOTE — TELEPHONE ENCOUNTER
Received Face to face encounter from [address]  To Dr. Jasson Bruce for review and signature    To fax back at [phone]  Send for scan

## 2021-07-21 ENCOUNTER — TELEPHONE (OUTPATIENT)
Dept: INTERNAL MEDICINE CLINIC | Facility: CLINIC | Age: 86
End: 2021-07-21

## 2021-07-21 NOTE — TELEPHONE ENCOUNTER
Incoming (mail or fax):  fax  Received from:  .[facility]   Documentation given to:  Triage in basket     Needs to be signed and returned

## 2021-07-21 NOTE — TELEPHONE ENCOUNTER
Received Plan of Care and PT eval from [facility]  To Dr. Reji Pereira to review and sign first 4 pages    To fax back at [phone]  And to send for scanning

## 2021-07-23 ENCOUNTER — MED REC SCAN ONLY (OUTPATIENT)
Dept: INTERNAL MEDICINE CLINIC | Facility: CLINIC | Age: 86
End: 2021-07-23

## 2021-07-26 ENCOUNTER — TELEPHONE (OUTPATIENT)
Dept: INTERNAL MEDICINE CLINIC | Facility: CLINIC | Age: 86
End: 2021-07-26

## 2021-07-26 ENCOUNTER — HOSPITAL ENCOUNTER (INPATIENT)
Facility: HOSPITAL | Age: 86
LOS: 2 days | Discharge: HOME HEALTH | DRG: 176 | End: 2021-07-28
Attending: EMERGENCY MEDICINE | Admitting: HOSPITALIST
Payer: MEDICARE

## 2021-07-26 ENCOUNTER — APPOINTMENT (OUTPATIENT)
Dept: CT IMAGING | Facility: HOSPITAL | Age: 86
DRG: 176 | End: 2021-07-26
Attending: EMERGENCY MEDICINE
Payer: MEDICARE

## 2021-07-26 ENCOUNTER — APPOINTMENT (OUTPATIENT)
Dept: GENERAL RADIOLOGY | Facility: HOSPITAL | Age: 86
DRG: 176 | End: 2021-07-26
Attending: EMERGENCY MEDICINE
Payer: MEDICARE

## 2021-07-26 DIAGNOSIS — D50.0 IRON DEFICIENCY ANEMIA DUE TO CHRONIC BLOOD LOSS: ICD-10-CM

## 2021-07-26 DIAGNOSIS — I26.99 OTHER ACUTE PULMONARY EMBOLISM WITHOUT ACUTE COR PULMONALE (HCC): Primary | ICD-10-CM

## 2021-07-26 DIAGNOSIS — Z09 NEED FOR FOLLOW-UP BY HOME HEALTH SERVICE: Primary | ICD-10-CM

## 2021-07-26 DIAGNOSIS — A04.72 CLOSTRIDIUM DIFFICILE COLITIS: ICD-10-CM

## 2021-07-26 PROBLEM — D64.9 ANEMIA: Status: ACTIVE | Noted: 2021-07-26

## 2021-07-26 PROBLEM — E87.1 HYPONATREMIA: Status: ACTIVE | Noted: 2021-07-26

## 2021-07-26 LAB
ALBUMIN SERPL-MCNC: 2.9 G/DL (ref 3.4–5)
ALBUMIN/GLOB SERPL: 0.9 {RATIO} (ref 1–2)
ALP LIVER SERPL-CCNC: 83 U/L
ALT SERPL-CCNC: 15 U/L
ANION GAP SERPL CALC-SCNC: 6 MMOL/L (ref 0–18)
AST SERPL-CCNC: 19 U/L (ref 15–37)
ATRIAL RATE: 83 BPM
BASOPHILS # BLD AUTO: 0.07 X10(3) UL (ref 0–0.2)
BASOPHILS NFR BLD AUTO: 0.6 %
BILIRUB SERPL-MCNC: 0.5 MG/DL (ref 0.1–2)
BUN BLD-MCNC: 16 MG/DL (ref 7–18)
BUN/CREAT SERPL: 20 (ref 10–20)
C DIFF TOX B STL QL: POSITIVE
CALCIUM BLD-MCNC: 8.6 MG/DL (ref 8.5–10.1)
CHLORIDE SERPL-SCNC: 102 MMOL/L (ref 98–112)
CO2 SERPL-SCNC: 26 MMOL/L (ref 21–32)
CREAT BLD-MCNC: 0.8 MG/DL
D-DIMER: 3.12 UG/ML FEU (ref ?–0.91)
DEPRECATED RDW RBC AUTO: 56.7 FL (ref 35.1–46.3)
EOSINOPHIL # BLD AUTO: 0.17 X10(3) UL (ref 0–0.7)
EOSINOPHIL NFR BLD AUTO: 1.5 %
ERYTHROCYTE [DISTWIDTH] IN BLOOD BY AUTOMATED COUNT: 15.2 % (ref 11–15)
GLOBULIN PLAS-MCNC: 3.3 G/DL (ref 2.8–4.4)
GLUCOSE BLD-MCNC: 103 MG/DL (ref 70–99)
HCT VFR BLD AUTO: 33.9 %
HGB BLD-MCNC: 11 G/DL
IMM GRANULOCYTES # BLD AUTO: 0.03 X10(3) UL (ref 0–1)
IMM GRANULOCYTES NFR BLD: 0.3 %
LACTATE SERPL-SCNC: 2 MMOL/L (ref 0.4–2)
LYMPHOCYTES # BLD AUTO: 1.4 X10(3) UL (ref 1–4)
LYMPHOCYTES NFR BLD AUTO: 12 %
M PROTEIN MFR SERPL ELPH: 6.2 G/DL (ref 6.4–8.2)
MCH RBC QN AUTO: 33.2 PG (ref 26–34)
MCHC RBC AUTO-ENTMCNC: 32.4 G/DL (ref 31–37)
MCV RBC AUTO: 102.4 FL
MONOCYTES # BLD AUTO: 1.02 X10(3) UL (ref 0.1–1)
MONOCYTES NFR BLD AUTO: 8.8 %
NEUTROPHILS # BLD AUTO: 8.93 X10 (3) UL (ref 1.5–7.7)
NEUTROPHILS # BLD AUTO: 8.93 X10(3) UL (ref 1.5–7.7)
NEUTROPHILS NFR BLD AUTO: 76.8 %
OSMOLALITY SERPL CALC.SUM OF ELEC: 279 MOSM/KG (ref 275–295)
P AXIS: 70 DEGREES
P-R INTERVAL: 214 MS
PLATELET # BLD AUTO: 257 10(3)UL (ref 150–450)
POTASSIUM SERPL-SCNC: 4 MMOL/L (ref 3.5–5.1)
Q-T INTERVAL: 370 MS
QRS DURATION: 74 MS
QTC CALCULATION (BEZET): 434 MS
R AXIS: -15 DEGREES
RBC # BLD AUTO: 3.31 X10(6)UL
SARS-COV-2 RNA RESP QL NAA+PROBE: NOT DETECTED
SODIUM SERPL-SCNC: 134 MMOL/L (ref 136–145)
T AXIS: 19 DEGREES
TROPONIN I SERPL-MCNC: <0.045 NG/ML (ref ?–0.04)
VENTRICULAR RATE: 83 BPM
WBC # BLD AUTO: 11.6 X10(3) UL (ref 4–11)

## 2021-07-26 PROCEDURE — 71275 CT ANGIOGRAPHY CHEST: CPT | Performed by: EMERGENCY MEDICINE

## 2021-07-26 PROCEDURE — 99223 1ST HOSP IP/OBS HIGH 75: CPT | Performed by: HOSPITALIST

## 2021-07-26 PROCEDURE — 71045 X-RAY EXAM CHEST 1 VIEW: CPT | Performed by: EMERGENCY MEDICINE

## 2021-07-26 RX ORDER — ONDANSETRON 2 MG/ML
4 INJECTION INTRAMUSCULAR; INTRAVENOUS EVERY 6 HOURS PRN
Status: DISCONTINUED | OUTPATIENT
Start: 2021-07-26 | End: 2021-07-29

## 2021-07-26 RX ORDER — ENOXAPARIN SODIUM 100 MG/ML
1 INJECTION SUBCUTANEOUS ONCE
Status: COMPLETED | OUTPATIENT
Start: 2021-07-26 | End: 2021-07-26

## 2021-07-26 RX ORDER — VANCOMYCIN HYDROCHLORIDE 125 MG/1
125 CAPSULE ORAL 4 TIMES DAILY
Status: DISCONTINUED | OUTPATIENT
Start: 2021-07-26 | End: 2021-07-26

## 2021-07-26 RX ORDER — METOCLOPRAMIDE HYDROCHLORIDE 5 MG/ML
5 INJECTION INTRAMUSCULAR; INTRAVENOUS EVERY 8 HOURS PRN
Status: DISCONTINUED | OUTPATIENT
Start: 2021-07-26 | End: 2021-07-26

## 2021-07-26 RX ORDER — VANCOMYCIN HYDROCHLORIDE 125 MG/1
125 CAPSULE ORAL EVERY 6 HOURS
Status: DISCONTINUED | OUTPATIENT
Start: 2021-07-26 | End: 2021-07-28 | Stop reason: SDUPTHER

## 2021-07-26 RX ORDER — SODIUM CHLORIDE 9 MG/ML
INJECTION, SOLUTION INTRAVENOUS CONTINUOUS
Status: DISCONTINUED | OUTPATIENT
Start: 2021-07-26 | End: 2021-07-29

## 2021-07-26 RX ORDER — GARLIC EXTRACT 500 MG
1 CAPSULE ORAL DAILY
Status: DISCONTINUED | OUTPATIENT
Start: 2021-07-27 | End: 2021-07-27

## 2021-07-26 RX ORDER — ENOXAPARIN SODIUM 100 MG/ML
1 INJECTION SUBCUTANEOUS EVERY 12 HOURS
Status: DISCONTINUED | OUTPATIENT
Start: 2021-07-27 | End: 2021-07-29

## 2021-07-26 RX ORDER — ENALAPRIL MALEATE 10 MG/1
20 TABLET ORAL DAILY
Status: DISCONTINUED | OUTPATIENT
Start: 2021-07-27 | End: 2021-07-29

## 2021-07-26 NOTE — H&P
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 Alert and oriented x 3. HEENT: Normocephalic atraumatic. Moist mucous membranes. EOM-I. PERRLA. Anicteric. Neck: No lymphadenopathy. No JVD. No carotid bruits. Respiratory: Clear to auscultation bilaterally. No wheezes. No rhonchi.   Card dehydration  2. IVF  4. Tremors  5. Deconditioning  1. bedbound  2.  PT/OT eval    Quality:  · DVT Prophylaxis: lovenox  · CODE status: DNAR/ seletive  · Yadav: no      Plan of care discussed with patient    Zaynab Morin MD  1/68/8603          **Certifica

## 2021-07-26 NOTE — TELEPHONE ENCOUNTER
Faxed order to [address] [place] [address] at [phone]. Confirmed fax sent. Noted pt at ER now. At this time, D-Dimer elevated. Blood Cultures x2 , Troponin, CMP pending. Pt also to have CT ANGIO CHEST done. Made Liseth aware of faxed order.   Liseth confirm

## 2021-07-26 NOTE — PLAN OF CARE
Assumed pt care @ 1800, pt alert, inappropriate speech at times. Denies pain. Heme c/s for DVT. On lovenox, plan for echo and BLE doppler. Pt and son updated on plan of care.

## 2021-07-26 NOTE — ED INITIAL ASSESSMENT (HPI)
Pt's family called the md office as pt had recent pneumonia and pt's family told md she was sob this am. Upon medic arrival no sob was noted and o2 sats were wnl. O2 sats wnl in ED as well. Denies chest pain at present and sob.

## 2021-07-26 NOTE — ED PROVIDER NOTES
Patient Seen in: [facility] Emergency Department      History   Patient presents with:  Difficulty Breathing    Stated Complaint: sob    HPI/Subjective:   HPI    Patient is a 69-year-old female who is a DNR, selective care who comes from home.   Home 3/3/2005   • Irregular heart beat 2003   • Lumbar degenerative disc disease 12/27/2011   • Lumbar spinal stenosis 12/27/2011   • Macular hole    • Miscarriage 1959   • Osteoarthritis, multiple sites 12/27/2011   • Osteoarthrosis, unspecified whether genera Physical Exam    General: Patient is elderly, somewhat sleepy 80year-old HEENT: Normal cephalic atraumatic. Nonicteric sclera. Moist mucous membranes. No meningismus. No adenopathy  Lungs: No tachypnea. Somewhat coarse bilaterally.   Good air for these tests on the individual orders. RAINBOW DRAW LAVENDER   RAINBOW DRAW LIGHT GREEN   RAINBOW DRAW GOLD   BLOOD CULTURE   BLOOD CULTURE     EKG    Rate, intervals and axes as noted on EKG Report.   Rate: 83  Rhythm: Sinus Rhythm  Reading: Normal si

## 2021-07-26 NOTE — ED QUICK NOTES
Pt's son at nurse's station inquiring about the possibility of Pt's C. Diff infection being back and if the MD was running any tests to determine that. MD/RN notified.

## 2021-07-26 NOTE — TELEPHONE ENCOUNTER
Signed. Okay for the physician to see her at home. I do agree with her being sent to ER if she is not comfort care only. Thank you.

## 2021-07-26 NOTE — TELEPHONE ENCOUNTER
Received call from [employer] from [facility]. Reports pt's condition is worsening since yesterday. Pt has nasal congestion, /49 yesterday. They pushed fluids & BP went up to 127/79. This morning,SpO2 is 89%, goes up to 92%. Has a cough now.   Karolina Tabor physicians. Please let them know. Once they agree please take my name off as pcp. I can resume care if she is able to come to the office in the future. \"

## 2021-07-27 ENCOUNTER — APPOINTMENT (OUTPATIENT)
Dept: CV DIAGNOSTICS | Facility: HOSPITAL | Age: 86
DRG: 176 | End: 2021-07-27
Attending: HOSPITALIST
Payer: MEDICARE

## 2021-07-27 ENCOUNTER — APPOINTMENT (OUTPATIENT)
Dept: ULTRASOUND IMAGING | Facility: HOSPITAL | Age: 86
DRG: 176 | End: 2021-07-27
Attending: HOSPITALIST
Payer: MEDICARE

## 2021-07-27 ENCOUNTER — APPOINTMENT (OUTPATIENT)
Dept: CT IMAGING | Facility: HOSPITAL | Age: 86
DRG: 176 | End: 2021-07-27
Attending: HOSPITALIST
Payer: MEDICARE

## 2021-07-27 LAB
ALLENS TEST: POSITIVE
ANION GAP SERPL CALC-SCNC: 7 MMOL/L (ref 0–18)
ARTERIAL BLD GAS O2 SATURATION: 93 % (ref 92–100)
ARTERIAL BLOOD GAS BASE EXCESS: 3.4 MMOL/L (ref ?–2)
ARTERIAL BLOOD GAS HCO3: 28.3 MEQ/L (ref 22–26)
ARTERIAL BLOOD GAS PCO2: 44 MM HG (ref 35–45)
ARTERIAL BLOOD GAS PH: 7.43 (ref 7.35–7.45)
ARTERIAL BLOOD GAS PO2: 67 MM HG (ref 80–105)
BUN BLD-MCNC: 14 MG/DL (ref 7–18)
BUN/CREAT SERPL: 22.2 (ref 10–20)
CALCIUM BLD-MCNC: 8.8 MG/DL (ref 8.5–10.1)
CALCULATED O2 SATURATION: 94 % (ref 92–100)
CARBOXYHEMOGLOBIN: 1.1 % SAT (ref 0–3)
CHLORIDE SERPL-SCNC: 105 MMOL/L (ref 98–112)
CO2 SERPL-SCNC: 27 MMOL/L (ref 21–32)
CREAT BLD-MCNC: 0.63 MG/DL
FIO2: 21 %
FOLATE SERPL-MCNC: 12.7 NG/ML (ref 8.7–?)
GLUCOSE BLD-MCNC: 82 MG/DL (ref 70–99)
INR BLD: 1.02 (ref 0.89–1.11)
IRON SATURATION: 12 %
IRON SERPL-MCNC: 25 UG/DL
METHEMOGLOBIN: 0.7 % SAT (ref 0.4–1.5)
OSMOLALITY SERPL CALC.SUM OF ELEC: 288 MOSM/KG (ref 275–295)
PATIENT TEMPERATURE: 97.8 F
POTASSIUM SERPL-SCNC: 3.9 MMOL/L (ref 3.5–5.1)
PSA SERPL DL<=0.01 NG/ML-MCNC: 13.6 SECONDS (ref 12.2–14.5)
SODIUM SERPL-SCNC: 139 MMOL/L (ref 136–145)
TOTAL HEMOGLOBIN: 10.5 G/DL
TOTAL IRON BINDING CAPACITY: 209 UG/DL (ref 240–450)
TRANSFERRIN SERPL-MCNC: 140 MG/DL (ref 200–360)
VIT B12 SERPL-MCNC: 671 PG/ML (ref 193–986)

## 2021-07-27 PROCEDURE — 93306 TTE W/DOPPLER COMPLETE: CPT | Performed by: HOSPITALIST

## 2021-07-27 PROCEDURE — 99232 SBSQ HOSP IP/OBS MODERATE 35: CPT | Performed by: HOSPITALIST

## 2021-07-27 PROCEDURE — 93970 EXTREMITY STUDY: CPT | Performed by: HOSPITALIST

## 2021-07-27 PROCEDURE — 70450 CT HEAD/BRAIN W/O DYE: CPT | Performed by: HOSPITALIST

## 2021-07-27 PROCEDURE — 99223 1ST HOSP IP/OBS HIGH 75: CPT | Performed by: SPECIALIST

## 2021-07-27 RX ORDER — HYDRALAZINE HYDROCHLORIDE 20 MG/ML
10 INJECTION INTRAMUSCULAR; INTRAVENOUS EVERY 6 HOURS PRN
Status: DISCONTINUED | OUTPATIENT
Start: 2021-07-27 | End: 2021-07-29

## 2021-07-27 RX ORDER — GARLIC EXTRACT 500 MG
1 CAPSULE ORAL 2 TIMES DAILY
Status: DISCONTINUED | OUTPATIENT
Start: 2021-07-27 | End: 2021-07-29

## 2021-07-27 RX ORDER — WARFARIN SODIUM 2.5 MG/1
2.5 TABLET ORAL
Status: COMPLETED | OUTPATIENT
Start: 2021-07-27 | End: 2021-07-27

## 2021-07-27 RX ORDER — ENOXAPARIN SODIUM 100 MG/ML
1 INJECTION SUBCUTANEOUS EVERY 12 HOURS
Qty: 7.5 ML | Refills: 1 | Status: SHIPPED | OUTPATIENT
Start: 2021-07-27 | End: 2021-08-01

## 2021-07-27 NOTE — TELEPHONE ENCOUNTER
Noted pt admitted for PE & CDIFF. Spoke to Lillie from [address] [place] [address] to update status. Liseth to discharge her from [facility] under Dr. Umm Álvarez. [facility] will send faxes for Dr. Umm Álvarez to sign.   Liseth aware that Dr. Umm Álvarez out office this week & [facility] can wait

## 2021-07-27 NOTE — CONSULTS
THE [facility] Report of Consultation      Patient Name: Saintclair Commons   YOB: 1930  Medical Record Number: [id number]  Consulting Physician: Samria Edge. Bre Felix M.D.    Referring Physician: Vicente Esparza MD    Date of C 12/17/2010   • History of obesity    • Hyperglycemia    • Hyperlipidemia    • IBS (irritable bowel syndrome)    • Internal hemorrhoids 3/3/2005   • Irregular heart beat 2003   • Lumbar degenerative disc disease 12/27/2011   • Lumbar spinal stenosis 12/27/2 Perflutren Lipid Microsphere (DEFINITY) 6.52 MG/ML injection 1.5 mL, 1.5 mL, Intravenous, ONCE PRN  Acidophilus/Pectin (PROBIOTIC) CAPS 1 capsule, 1 capsule, Oral, BID  [COMPLETED] iohexol (OMNIPAQUE) 350 MG/ML injection 100 mL, 100 mL, Intravenous, ONCE P distress; appears close to chronological age. Head Normocephalic and atraumatic. Eyes Conjunctiva clear; sclera anicteric. ENMT External nose normal; external ears normal.  Neck No JVD. Hematologic/Lymphatic No petechiae or purpura.   Respiratory Normal Total 0.5 0.1 - 2.0 mg/dL    Total Protein 6.2 (L) 6.4 - 8.2 g/dL    Albumin 2.9 (L) 3.4 - 5.0 g/dL    Globulin  3.3 2.8 - 4.4 g/dL    A/G Ratio 0.9 (L) 1.0 - 2.0   Blood Culture    Collection Time: 07/26/21 10:26 AM    Specimen: Blood,peripheral   Result Time: 07/26/21  1:58 PM    Specimen: Stool   Result Value Ref Range    C.  Difficile Toxin B Gene Positive (A) Negative   Basic Metabolic Panel (8)    Collection Time: 07/27/21  5:24 AM   Result Value Ref Range    Glucose 82 70 - 99 mg/dL    Sodium 139 136 adenopathy or mass.     DWAYNE:  Calcified nodes consistent with prior granulomatous disease.     CARDIAC:  No enlargement or pericardial thickening.  Coronary artery calcifications.    PLEURA:  No mass or effusion.     CHEST WALL:  No mass or axillary adenop bilaterally.                            Impression   CONCLUSION:  Positive DVT study.  Short segment nonocclusive thrombus in the left lower extremity.       [facility] with accurate read back.  1042 hours.

## 2021-07-27 NOTE — PHYSICAL THERAPY NOTE
Orders received and hx and chart reviewed. Pt to have Doppler to R/O DVT. Will continue to follow and see pt when appropriate. Thank you.

## 2021-07-27 NOTE — OCCUPATIONAL THERAPY NOTE
OCCUPATIONAL THERAPY                      Per PT who called the patient's daughter, the patient is dependent with ADLs and mobility. She gets out of bed 1 time per week.  Sylvester Armor lift was attempted however family sent it back due to patient unable

## 2021-07-27 NOTE — CONSULTS
[facility] Dosing Service  Warfarin (Coumadin) Initial Dosing    Mikey Mccall is a 80year old patient for whom pharmacy has been consulted to dose warfarin (COUMADIN) for Treatment of pulmonary embolism by Dr. Luis Eduardo Lagos.   Based on this indication,

## 2021-07-27 NOTE — CM/SW NOTE
150 [facility], the eegoes stater pack copay cost for pt is $647.55/mo - she would only be able to use the 30 day free coupon.

## 2021-07-27 NOTE — PROGRESS NOTES
[facility]  Progress Note     Agustina Ornelas Patient Status:  Inpatient    3/15/1930 MRN [id number]   [facility] 7NE-A Attending Efrain Devlin MD   Hosp Day # 1 PCP Gianna Mercer MD     Chief Complaint: hypoxia    S: Patient Pro-Calcitonin  No results for input(s): PCT in the last 168 hours. Cardiac  Recent Labs   Lab 07/26/21  1026   TROP <0.045       Creatinine Kinase  No results for input(s): CK in the last 168 hours.     Inflammatory Markers  Recent Labs   Lab 07/2

## 2021-07-27 NOTE — PLAN OF CARE
Assumed care at 1. Pt a/ox3, forgetful at times. VSS. NSR per tele. RA. Denies pain. IVF infusing per order. Contact isolation maintained. Pt and son updated w/ POC. Call light in reach. Needs attended to. Will continue to monitor.  Plan: BLE doppler and

## 2021-07-27 NOTE — PHYSICAL THERAPY NOTE
Orders received and hx and chart reviewed. Pt is at her baseline level of function and has no skilled inpt PT needs at this time. Pt has caregivers at home who assist pt with all functional mobility and ADL and home PT.  Family tried katiuska lift and pt was u

## 2021-07-27 NOTE — OCCUPATIONAL THERAPY NOTE
OCCUPATIONAL THERAPY                        OT order received, chart reviewed. BLE venous doppler test ordered to eval for DVT. Will await test results prior to initiation of therapy services.

## 2021-07-27 NOTE — CM/SW NOTE
5 [facility] starter pack copay for pt is $879.77/mo - she would only be able to use the 30 day free coupon.

## 2021-07-28 VITALS
HEART RATE: 86 BPM | DIASTOLIC BLOOD PRESSURE: 61 MMHG | TEMPERATURE: 99 F | OXYGEN SATURATION: 94 % | WEIGHT: 165 LBS | RESPIRATION RATE: 14 BRPM | SYSTOLIC BLOOD PRESSURE: 145 MMHG | HEIGHT: 64 IN | BODY MASS INDEX: 28.17 KG/M2

## 2021-07-28 DIAGNOSIS — I82.4Y2 ACUTE DEEP VEIN THROMBOSIS (DVT) OF PROXIMAL VEIN OF LEFT LOWER EXTREMITY (HCC): Primary | ICD-10-CM

## 2021-07-28 LAB
INR BLD: 1.1 (ref 0.89–1.11)
PSA SERPL DL<=0.01 NG/ML-MCNC: 14.4 SECONDS (ref 12.2–14.5)

## 2021-07-28 PROCEDURE — 99239 HOSP IP/OBS DSCHRG MGMT >30: CPT | Performed by: HOSPITALIST

## 2021-07-28 PROCEDURE — 99232 SBSQ HOSP IP/OBS MODERATE 35: CPT | Performed by: SPECIALIST

## 2021-07-28 RX ORDER — VANCOMYCIN HYDROCHLORIDE 125 MG/1
125 CAPSULE ORAL 4 TIMES DAILY
Status: DISCONTINUED | OUTPATIENT
Start: 2021-07-28 | End: 2021-07-29

## 2021-07-28 RX ORDER — WARFARIN SODIUM 2.5 MG/1
2.5 TABLET ORAL NIGHTLY
Qty: 30 TABLET | Refills: 3 | Status: SHIPPED | OUTPATIENT
Start: 2021-07-28 | End: 2021-08-27

## 2021-07-28 RX ORDER — WARFARIN SODIUM 2.5 MG/1
2.5 TABLET ORAL
Status: DISCONTINUED | OUTPATIENT
Start: 2021-07-28 | End: 2021-07-29

## 2021-07-28 RX ORDER — VANCOMYCIN HYDROCHLORIDE 125 MG/1
125 CAPSULE ORAL EVERY 6 HOURS
Qty: 48 CAPSULE | Refills: 0 | Status: SHIPPED | OUTPATIENT
Start: 2021-07-28 | End: 2021-08-09

## 2021-07-28 NOTE — PROGRESS NOTES
[facility] Dosing Service  Warfarin (Coumadin) Subsequent Dosing    Fer Sandoval is a 80year old patient for whom pharmacy is dosing warfarin (Coumadin).  Goal INR is 2-3    Recent Labs   Lab 07/27/21  1424 07/28/21  0525   INR 1.02 1.10       Co

## 2021-07-28 NOTE — PLAN OF CARE
Assumed care of patient @ 0730. A&Ox3. Forgetful at times. RA. NSR. BP maintained within parameters. Patient denies any pain. No acute neuro change. Good appetite. Tolerating diet well. IVF infusing @ 50  DC planning.  Possible dc home today on

## 2021-07-28 NOTE — PLAN OF CARE
Assumed care at [address]. Pt a/ox3, forgetful at times. VSS. NSR per tele. RA. Denies pain. IVF infusing per order. Contact isolation maintained. Warfarin given per order. Pt updated w/ POC. Call light in reach. Needs attended to. Will continue to monitor.

## 2021-07-28 NOTE — DISCHARGE PLANNING
NURSING DISCHARGE NOTE    Discharged Home via Wheelchair. Accompanied by Family member  Belongings Taken by patient/family. Okay to dc patient home from all physicians. Son at bedside. AV'S paper reviewed. Discharge paper work reviewed.  Medication c

## 2021-07-28 NOTE — CM/SW NOTE
Pt is a 81 yo female admitted for Cdiff. Pt was + for Cdiff. Pt lives in a tri-level home in the family room with her  and son temporarily. She have 5 children - 3 local and 2 out of state.   Pt is usually bed bound at baseline - she does not walk

## 2021-07-28 NOTE — PROGRESS NOTES
Patient noted by family to be increasingly more confused this evening, no focal weakness or numbness    Discussed with RN and patients son over the phone    Stat CT scan ordered and reviewed- no acute changes    Abg reviewed    Imp  Suspected delirium due

## 2021-07-28 NOTE — PROGRESS NOTES
[facility] Progress Note      Patient Name: Arturo Bruno   YOB: 1930  Medical Record Number: [id number]    Subjective  No bleeding.     Current Medications [COMPLETED] Perflutren Lipid Microsphere (DEFINITY) 6.52 M distress. Cardiovascular Regular rate and rhythm. Neurologic Motor and sensory grossly intact. Psychiatric Mood and affect appropriate; coherent speech; verbalizes understanding of our discussions today.     Laboratory   Recent Results (from the past 24

## 2021-07-29 ENCOUNTER — TELEPHONE (OUTPATIENT)
Dept: HEMATOLOGY/ONCOLOGY | Facility: HOSPITAL | Age: 86
End: 2021-07-29

## 2021-07-29 ENCOUNTER — ANTI-COAG VISIT (OUTPATIENT)
Dept: HEMATOLOGY/ONCOLOGY | Facility: HOSPITAL | Age: 86
End: 2021-07-29

## 2021-07-29 ENCOUNTER — TELEPHONE (OUTPATIENT)
Dept: INTERNAL MEDICINE CLINIC | Facility: CLINIC | Age: 86
End: 2021-07-29

## 2021-07-29 ENCOUNTER — PATIENT OUTREACH (OUTPATIENT)
Dept: CASE MANAGEMENT | Age: 86
End: 2021-07-29

## 2021-07-29 DIAGNOSIS — I82.4Y2 ACUTE DEEP VEIN THROMBOSIS (DVT) OF PROXIMAL VEIN OF LEFT LOWER EXTREMITY (HCC): ICD-10-CM

## 2021-07-29 LAB — INR: 1 (ref 0.8–1.2)

## 2021-07-29 NOTE — CM/SW NOTE
07/29/21 0800   Discharge disposition   Expected discharge disposition Home-Health   Name of Facillity/Home Care/Hospice   ([facility]im[facility] Vazquez)   Home services after discharge Skilled home care;Employed caregiver   Discharge transportation [employer]

## 2021-07-29 NOTE — TELEPHONE ENCOUNTER
Marlene calling from [facility] reporting PT/ INr    Dr. Iva Mejia did consult on patient in [facility] 7/26/21     PT- 11.8  INR 1.0    Call [phone] for Orders

## 2021-07-29 NOTE — TELEPHONE ENCOUNTER
Noted TCM reached out to pt today. PSR - Please schedule pt for TCM-HFU by 8/4/21. Thanks! If family refuses, see 7/26/21. I believe the [facility] was going to take over pt's care since they cannot bring pt to office.  If so, please initiate PCP remov

## 2021-07-30 ENCOUNTER — ANTI-COAG VISIT (OUTPATIENT)
Dept: HEMATOLOGY/ONCOLOGY | Facility: HOSPITAL | Age: 86
End: 2021-07-30

## 2021-07-30 DIAGNOSIS — I82.4Y2 ACUTE DEEP VEIN THROMBOSIS (DVT) OF PROXIMAL VEIN OF LEFT LOWER EXTREMITY (HCC): ICD-10-CM

## 2021-07-30 LAB — INR: 1.03 (ref 0.8–1.2)

## 2021-08-02 RX ORDER — ENOXAPARIN SODIUM 100 MG/ML
1 INJECTION SUBCUTANEOUS EVERY 12 HOURS
Qty: 7.5 ML | Refills: 1 | Status: CANCELLED
Start: 2021-08-02 | End: 2021-08-07

## 2021-08-03 ENCOUNTER — ANTI-COAG VISIT (OUTPATIENT)
Dept: HEMATOLOGY/ONCOLOGY | Facility: HOSPITAL | Age: 86
End: 2021-08-03

## 2021-08-03 DIAGNOSIS — I82.4Y2 ACUTE DEEP VEIN THROMBOSIS (DVT) OF PROXIMAL VEIN OF LEFT LOWER EXTREMITY (HCC): ICD-10-CM

## 2021-08-03 LAB — INR: 1.12 (ref 0.8–1.2)

## 2021-08-04 ENCOUNTER — APPOINTMENT (OUTPATIENT)
Dept: GENERAL RADIOLOGY | Facility: HOSPITAL | Age: 86
End: 2021-08-04
Payer: MEDICARE

## 2021-08-04 ENCOUNTER — HOSPITAL ENCOUNTER (EMERGENCY)
Facility: HOSPITAL | Age: 86
Discharge: NURSING, REHAB OR LONG-TERM CARE | End: 2021-08-05
Attending: EMERGENCY MEDICINE
Payer: MEDICARE

## 2021-08-04 DIAGNOSIS — R53.1 WEAKNESS: Primary | ICD-10-CM

## 2021-08-04 LAB
ALBUMIN SERPL-MCNC: 2.7 G/DL (ref 3.4–5)
ALBUMIN/GLOB SERPL: 0.7 {RATIO} (ref 1–2)
ALP LIVER SERPL-CCNC: 82 U/L
ALT SERPL-CCNC: 29 U/L
ANION GAP SERPL CALC-SCNC: 4 MMOL/L (ref 0–18)
AST SERPL-CCNC: 23 U/L (ref 15–37)
BASOPHILS # BLD AUTO: 0.08 X10(3) UL (ref 0–0.2)
BASOPHILS NFR BLD AUTO: 1.2 %
BILIRUB SERPL-MCNC: 0.1 MG/DL (ref 0.1–2)
BILIRUB UR QL STRIP.AUTO: NEGATIVE
BUN BLD-MCNC: 16 MG/DL (ref 7–18)
CALCIUM BLD-MCNC: 8.7 MG/DL (ref 8.5–10.1)
CHLORIDE SERPL-SCNC: 105 MMOL/L (ref 98–112)
CK SERPL-CCNC: 41 U/L
CLARITY UR REFRACT.AUTO: CLEAR
CO2 SERPL-SCNC: 29 MMOL/L (ref 21–32)
CREAT BLD-MCNC: 0.7 MG/DL
EOSINOPHIL # BLD AUTO: 0.27 X10(3) UL (ref 0–0.7)
EOSINOPHIL NFR BLD AUTO: 4 %
ERYTHROCYTE [DISTWIDTH] IN BLOOD BY AUTOMATED COUNT: 14.7 %
GLOBULIN PLAS-MCNC: 3.7 G/DL (ref 2.8–4.4)
GLUCOSE BLD-MCNC: 134 MG/DL (ref 70–99)
GLUCOSE UR STRIP.AUTO-MCNC: NEGATIVE MG/DL
HCT VFR BLD AUTO: 35 %
HGB BLD-MCNC: 11.6 G/DL
IMM GRANULOCYTES # BLD AUTO: 0.05 X10(3) UL (ref 0–1)
IMM GRANULOCYTES NFR BLD: 0.7 %
KETONES UR STRIP.AUTO-MCNC: NEGATIVE MG/DL
LEUKOCYTE ESTERASE UR QL STRIP.AUTO: NEGATIVE
LYMPHOCYTES # BLD AUTO: 1.88 X10(3) UL (ref 1–4)
LYMPHOCYTES NFR BLD AUTO: 28 %
M PROTEIN MFR SERPL ELPH: 6.4 G/DL (ref 6.4–8.2)
MCH RBC QN AUTO: 33.7 PG (ref 26–34)
MCHC RBC AUTO-ENTMCNC: 33.1 G/DL (ref 31–37)
MCV RBC AUTO: 101.7 FL
MONOCYTES # BLD AUTO: 0.64 X10(3) UL (ref 0.1–1)
MONOCYTES NFR BLD AUTO: 9.5 %
NEUTROPHILS # BLD AUTO: 3.8 X10 (3) UL (ref 1.5–7.7)
NEUTROPHILS # BLD AUTO: 3.8 X10(3) UL (ref 1.5–7.7)
NEUTROPHILS NFR BLD AUTO: 56.6 %
NITRITE UR QL STRIP.AUTO: NEGATIVE
NT-PROBNP SERPL-MCNC: 410 PG/ML (ref ?–450)
OSMOLALITY SERPL CALC.SUM OF ELEC: 289 MOSM/KG (ref 275–295)
PH UR STRIP.AUTO: 7 [PH] (ref 5–8)
PLATELET # BLD AUTO: 428 10(3)UL (ref 150–450)
POTASSIUM SERPL-SCNC: 4.5 MMOL/L (ref 3.5–5.1)
PROT UR STRIP.AUTO-MCNC: NEGATIVE MG/DL
RBC # BLD AUTO: 3.44 X10(6)UL
RBC UR QL AUTO: NEGATIVE
SODIUM SERPL-SCNC: 138 MMOL/L (ref 136–145)
SP GR UR STRIP.AUTO: 1.01 (ref 1–1.03)
UROBILINOGEN UR STRIP.AUTO-MCNC: <2 MG/DL
WBC # BLD AUTO: 6.7 X10(3) UL (ref 4–11)

## 2021-08-04 PROCEDURE — 71045 X-RAY EXAM CHEST 1 VIEW: CPT | Performed by: EMERGENCY MEDICINE

## 2021-08-04 PROCEDURE — 99213 OFFICE O/P EST LOW 20 MIN: CPT | Performed by: HOSPITALIST

## 2021-08-04 RX ORDER — POLYETHYLENE GLYCOL 3350 17 G/17G
17 POWDER, FOR SOLUTION ORAL DAILY PRN
Status: CANCELLED | OUTPATIENT
Start: 2021-08-04

## 2021-08-04 RX ORDER — BISACODYL 10 MG
10 SUPPOSITORY, RECTAL RECTAL
Status: CANCELLED | OUTPATIENT
Start: 2021-08-04

## 2021-08-04 RX ORDER — SODIUM PHOSPHATE, DIBASIC AND SODIUM PHOSPHATE, MONOBASIC 7; 19 G/133ML; G/133ML
1 ENEMA RECTAL ONCE AS NEEDED
Status: CANCELLED | OUTPATIENT
Start: 2021-08-04

## 2021-08-04 RX ORDER — ENOXAPARIN SODIUM 100 MG/ML
40 INJECTION SUBCUTANEOUS DAILY
Status: CANCELLED | OUTPATIENT
Start: 2021-08-04

## 2021-08-04 RX ORDER — ACETAMINOPHEN 325 MG/1
650 TABLET ORAL EVERY 6 HOURS PRN
Status: CANCELLED | OUTPATIENT
Start: 2021-08-04

## 2021-08-04 RX ORDER — SODIUM CHLORIDE 9 MG/ML
INJECTION, SOLUTION INTRAVENOUS CONTINUOUS
Status: DISCONTINUED | OUTPATIENT
Start: 2021-08-04 | End: 2021-08-05

## 2021-08-04 RX ORDER — SODIUM CHLORIDE 9 MG/ML
INJECTION, SOLUTION INTRAVENOUS CONTINUOUS
Status: CANCELLED | OUTPATIENT
Start: 2021-08-04 | End: 2021-08-05

## 2021-08-04 RX ORDER — ONDANSETRON 2 MG/ML
4 INJECTION INTRAMUSCULAR; INTRAVENOUS EVERY 6 HOURS PRN
Status: CANCELLED | OUTPATIENT
Start: 2021-08-04

## 2021-08-04 RX ORDER — ENOXAPARIN SODIUM 100 MG/ML
70 INJECTION SUBCUTANEOUS ONCE
Status: COMPLETED | OUTPATIENT
Start: 2021-08-04 | End: 2021-08-04

## 2021-08-04 RX ORDER — VANCOMYCIN HYDROCHLORIDE 125 MG/1
125 CAPSULE ORAL EVERY 6 HOURS
Status: DISCONTINUED | OUTPATIENT
Start: 2021-08-04 | End: 2021-08-05

## 2021-08-04 RX ORDER — MELATONIN
3 NIGHTLY PRN
Status: CANCELLED | OUTPATIENT
Start: 2021-08-04

## 2021-08-04 RX ORDER — WARFARIN SODIUM 2.5 MG/1
2.5 TABLET ORAL NIGHTLY
Status: DISCONTINUED | OUTPATIENT
Start: 2021-08-04 | End: 2021-08-05

## 2021-08-04 NOTE — ED QUICK NOTES
Pt stating she is getting tired from the mx admits and discharges to hospital. Pt stating she is feeling very discouraged. Discussed options to help her get a better outlook. Pt agrees. Son at bs.  Pt stating she misses seeing her  as she cannot see

## 2021-08-04 NOTE — ED INITIAL ASSESSMENT (HPI)
Per EMS patients son called due to patient waking up slower than normal from her nap and felt she was breathing too shallow. Patient has no complaints at this time. Recently admitted for Cdiff and DVT's.

## 2021-08-04 NOTE — ED PROVIDER NOTES
Patient Seen in: [facility] Emergency Department      History   Patient presents with:  Difficulty Breathing    Stated Complaint: shallow breathing, slow to wake up from nap per family.  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 Constitutional and vital signs reviewed. All other systems reviewed and negative except as noted above.     Physical Exam     ED Triage Vitals [08/04/21 1442]   /54   Pulse 95   Resp 18   Temp 98.3 °F (3 Narrative: The following orders were created for panel order CBC With Differential With Platelet.   Procedure                               Abnormality         Status                     ---------                               -----------         ------ this CT examination.    Dictated by (CST): Gail Aguilar MD on 7/27/2021 at 7:39 PM     Finalized by (CST): Gail Aguilar MD on 7/27/2021 at 7:41 PM       CT ANGIOGRAPHY, CHEST (CPT=71275)    Result Date: 7/26/2021  PROCEDURE:  CT ANGIOGRAPHY, CHEST (CP subsegmental branches of the pulmonary artery bilaterally as detailed above. Critical findings discussed with the ED MD, Dr. Wendy Godinez at approximately [phone] hours on 7/26/2021. Read back performed.  1.  Dictated by (CST): Stephany Knight MD on 7/26/2021 at 1:41 P Services*                                                     [address], [address]                                                               (392 size was normal. Wall thickness was normal.    Systolic function was normal. The estimated ejection fraction was 60-65%. Impressions:   This study is compared with previous dated 05-25-17: Compared to the prior study, there has been no significant interval shortening, PLAX              30    %      27 - 45  LV PW thickness, ED, PLAX           0.9   cm     0.6 - 0.9  LV ejection fraction                58    %      >=55   Ventricular septum                  Value        Reference  IVS thickness, ED, PLAX base although pneumonia is difficult  to exclude. Mild left basilar atelectasis noted. Heart size is enlarged and stable. There is atheromatous calcified plaque seen throughout the aorta which is tortuous.   Degenerative changes are seen throughout the s condition now. Updated 6:45 PM.  No real indication for admission. Case discussed with Dr. Yonis Salcedo the hospitalist who saw the patient and reviewed labs. He agrees no definite indication for admission.   Patient wants to go home but her son does not r

## 2021-08-05 ENCOUNTER — INITIAL APN SNF VISIT (OUTPATIENT)
Dept: INTERNAL MEDICINE CLINIC | Age: 86
End: 2021-08-05

## 2021-08-05 VITALS
HEART RATE: 81 BPM | WEIGHT: 164.5 LBS | OXYGEN SATURATION: 92 % | SYSTOLIC BLOOD PRESSURE: 155 MMHG | DIASTOLIC BLOOD PRESSURE: 64 MMHG | BODY MASS INDEX: 28 KG/M2 | RESPIRATION RATE: 19 BRPM | TEMPERATURE: 97 F

## 2021-08-05 VITALS
DIASTOLIC BLOOD PRESSURE: 58 MMHG | OXYGEN SATURATION: 93 % | HEART RATE: 81 BPM | SYSTOLIC BLOOD PRESSURE: 151 MMHG | BODY MASS INDEX: 28.15 KG/M2 | HEIGHT: 64 IN | WEIGHT: 164.88 LBS | TEMPERATURE: 98 F | RESPIRATION RATE: 16 BRPM

## 2021-08-05 DIAGNOSIS — I82.402 ACUTE DEEP VEIN THROMBOSIS (DVT) OF LEFT LOWER EXTREMITY, UNSPECIFIED VEIN (HCC): ICD-10-CM

## 2021-08-05 DIAGNOSIS — I26.94 MULTIPLE SUBSEGMENTAL PULMONARY EMBOLI WITHOUT ACUTE COR PULMONALE (HCC): ICD-10-CM

## 2021-08-05 DIAGNOSIS — Z78.9 IMPAIRED MOBILITY AND ADLS: ICD-10-CM

## 2021-08-05 DIAGNOSIS — M25.512 LEFT SHOULDER PAIN, UNSPECIFIED CHRONICITY: ICD-10-CM

## 2021-08-05 DIAGNOSIS — B37.0 ORAL CANDIDIASIS: ICD-10-CM

## 2021-08-05 DIAGNOSIS — Z74.09 IMPAIRED MOBILITY AND ADLS: ICD-10-CM

## 2021-08-05 DIAGNOSIS — A04.72 C. DIFFICILE COLITIS: Primary | ICD-10-CM

## 2021-08-05 DIAGNOSIS — R53.81 PHYSICAL DECONDITIONING: ICD-10-CM

## 2021-08-05 DIAGNOSIS — D64.9 ANEMIA, UNSPECIFIED TYPE: ICD-10-CM

## 2021-08-05 DIAGNOSIS — M15.9 OSTEOARTHRITIS OF MULTIPLE JOINTS, UNSPECIFIED OSTEOARTHRITIS TYPE: ICD-10-CM

## 2021-08-05 DIAGNOSIS — R45.1 AGITATION: ICD-10-CM

## 2021-08-05 DIAGNOSIS — I10 BENIGN ESSENTIAL HTN: ICD-10-CM

## 2021-08-05 PROCEDURE — 1111F DSCHRG MED/CURRENT MED MERGE: CPT | Performed by: NURSE PRACTITIONER

## 2021-08-05 PROCEDURE — 99310 SBSQ NF CARE HIGH MDM 45: CPT | Performed by: NURSE PRACTITIONER

## 2021-08-05 PROCEDURE — 1123F ACP DISCUSS/DSCN MKR DOCD: CPT | Performed by: NURSE PRACTITIONER

## 2021-08-05 RX ORDER — LIDOCAINE 50 MG/G
1 PATCH TOPICAL EVERY 24 HOURS
COMMUNITY

## 2021-08-05 RX ORDER — ENOXAPARIN SODIUM 100 MG/ML
1 INJECTION SUBCUTANEOUS 2 TIMES DAILY
COMMUNITY
End: 2021-08-17

## 2021-08-05 NOTE — CM/SW NOTE
14 Referrals placed in [facility]. Face sheet, ER encounter summary, IP transfer report and ERCM notes sent via Aidin. Called Paddy Buchanan @ [facility] re: SNF placement - he will call ERCM back shortly.

## 2021-08-05 NOTE — CM/SW NOTE
Called by Dr. Jadyn Chester for update on SNF placement - informed him ERCM would like to avoid OBS admitting patient and hold in ER overnight per Dr. Jadyn Chester due to ER census we will likely have to OBS admit patient if not able to get accepting SNF tonight.

## 2021-08-05 NOTE — CM/SW NOTE
Rec'd call from Yelena from [facility] that she spoke with pt's son Solomon Garcia and he is agreeable to SNF placement at [facility]. Ok per Yelena for [facility]. to arrange BLS to [facility] now.  Also rec'd call from pt's son John Rodriguesol

## 2021-08-05 NOTE — H&P
[facility]  History and Physical     Deion Lobo Patient Status:  Emergency    3/15/1930 MRN [id number]   Location [facility] Attending Justen Jade MD   Hosp Day # 0 PCP Misha Merino MD     Chief Complain • Hiatal hernia 3/3/2005    small   • History of bone density study 12/17/2010   • History of obesity    • Hyperglycemia    • Hyperlipidemia    • IBS (irritable bowel syndrome)    • Internal hemorrhoids 3/3/2005   • Irregular heart beat 2003   • Lumbar d Allergies:    Allergy                 ITCHING    Comment:SOME PETROLEUM MATERIALS-OLEFIN             SOME GARDEN PRODUCTS  Astelin                 PALPITATIONS  Benadryl [Diphenhyd*        Comment:Pt gets cold and shakes  Beta Adrenergic Blo*    DIZZI Moist mucous membranes. EOM-I. PERRLA. Anicteric. Neck: No lymphadenopathy. No JVD. No carotid bruits. Respiratory: Clear to auscultation bilaterally. No wheezes. No rhonchi. Cardiovascular: S1, S2. Regular rate and rhythm.  No murmurs, no rubs or gallop

## 2021-08-05 NOTE — CM/SW NOTE
JANINE called Jaren ULLOA at [facility] @ [phone] - re: SNF placement luis a  Jaren ULLOA will call her  and call JANINE back.

## 2021-08-05 NOTE — CM/SW NOTE
Iris Lovett SUP @ [facility] calling back stating she spoke with her  and they are unable to admit patient tonight as they need to get Authorization first - Radha Melo states we can try again in the AM.

## 2021-08-05 NOTE — PROGRESS NOTES
Agustina Ornelas  : 3/15/1930  Age 80year old  female patient is admitted to Facility:  [address] [place] [address],[address] for  [address] ED visit date:   21  Discharge date to [facility]:   21  ELOS:   12-14 days  Anticipated discharge date:     • Cervical spine degeneration 12/27/2011   • Change in bowel habits    • Change in vision 6/14/2012    change in visual field in R eye   • Chronic neck pain 3/5/2012   • Conjunctivitis    • Constipation 6/6/2012   • Disorder of bone and cartilage, unspec HYSTERECTOMY     • KNEE REPLACEMENT SURGERY  2001    L total knee    • OTHER SURGICAL HISTORY      coron angio min LAD   • TONSILLECTOMY  1948   • TOTAL ABDOM HYSTERECTOMY  1974    LUCIUS BSO   • UPPER GI ENDOSCOPY,BIOPSY  3/3/2005     Family History   Proble (six) hours for 12 days. 48 capsule 0   • warfarin 2.5 MG Oral Tab Take 1 tablet (2.5 mg total) by mouth nightly. 30 tablet 3   • acetaminophen 500 MG Oral Tab Take 500 mg by mouth every 6 (six) hours as needed for Pain.      • Acidophilus/Pectin Oral Cap T normal; there is no nystagmus, no drainage from eyes  HENT: normocephalic; normal nose, no nasal drainage, mucous membranes pink, moist, pharynx no exudate, no visible cerumen.   +light yellow coating on tongue c/w thrush  NECK: supple; FROM; no JVD, no TMG [  ]  Cardiac catheterization/PCI  [  ]  Recurrent Acute MI                                         [ x ]   Stroke                                    [  ]   Diabetes  [  ]  Respiratory failure with ventilator                   [  ]  Anthony Grewal views STAT  · ADD Lidocaine 5% patch on 12 hrs and off 12 hrs  · Tylenol 500 mg q 6 hrs prn    Recurrent C Diff  · Contact isolation  · Monitor BMs  · Vancomycin 125 mg q 6 hrs until 8.17.21  · Probiotic daily  · Judicious use of abx    B/L non-occlusive P

## 2021-08-05 NOTE — CM/SW NOTE
Son Blanka Rogers calls back - per son Blanka Rogers he states he called his brother Jenna Lincoln (pt's Healthcare POA) and sister Rolando Aburto (pt's Financial POA) and discussed SNF placement with both of them with patient on speaker phone and they are all agreeable to patient going to

## 2021-08-05 NOTE — CM/SW NOTE
Left detailed message for Sondra with [facility] @ [phone] on referral for patient and for Sondra to please contact pt's son Solomon Garcia tomorrow AM to discuss safer living options for patient (and pt's ) upon pt's discharge from [facility]

## 2021-08-05 NOTE — CM/SW NOTE
Nida Cope [facility] unable to accept patient tonight as does not have isolation bed.

## 2021-08-05 NOTE — CM/SW NOTE
Radha Lunsford from [facility]. stating they are able to accept patient tonight and per [facility] can provide pt's son Rowena Moncada with Yissel's contact# [phone] and per Radha Lunsford son Rowena Moncada can call her [facility].  [facility] also accepted patient

## 2021-08-05 NOTE — CM/SW NOTE
JANINE called pt's son Vivian Asencio @ [phone] and informed him [id number] unable to accept patient tonight. Also informed son Vivian Asencio likely [facility] in [facility] will not accept night admission so JANINE did not contact them.  Also informed

## 2021-08-05 NOTE — CM/SW NOTE
Spoke to patient and son joaquin at the bedside. Pt lives with her  and her son Octavia Sampson at the bedside has been here since June to help out. Pt has other children who live close in the area.  Pt has had sevral admissions to the hospital along with gloria

## 2021-08-06 ENCOUNTER — EXTERNAL FACILITY (OUTPATIENT)
Dept: FAMILY MEDICINE CLINIC | Facility: CLINIC | Age: 86
End: 2021-08-06

## 2021-08-06 DIAGNOSIS — R79.1 SUBTHERAPEUTIC INTERNATIONAL NORMALIZED RATIO (INR): ICD-10-CM

## 2021-08-06 DIAGNOSIS — R53.1 WEAKNESS: Primary | ICD-10-CM

## 2021-08-06 DIAGNOSIS — I82.412 DEEP VENOUS THROMBOSIS OF LEFT PROFUNDA FEMORIS VEIN (HCC): ICD-10-CM

## 2021-08-06 DIAGNOSIS — D50.9 IRON DEFICIENCY ANEMIA, UNSPECIFIED IRON DEFICIENCY ANEMIA TYPE: ICD-10-CM

## 2021-08-06 DIAGNOSIS — A04.72 C. DIFFICILE COLITIS: ICD-10-CM

## 2021-08-06 DIAGNOSIS — I26.94 MULTIPLE SUBSEGMENTAL PULMONARY EMBOLI WITHOUT ACUTE COR PULMONALE (HCC): ICD-10-CM

## 2021-08-06 DIAGNOSIS — F32.1 CURRENT MODERATE EPISODE OF MAJOR DEPRESSIVE DISORDER WITHOUT PRIOR EPISODE (HCC): ICD-10-CM

## 2021-08-06 DIAGNOSIS — I10 BENIGN ESSENTIAL HTN: ICD-10-CM

## 2021-08-06 PROCEDURE — 99306 1ST NF CARE HIGH MDM 50: CPT | Performed by: FAMILY MEDICINE

## 2021-08-07 VITALS
HEART RATE: 72 BPM | BODY MASS INDEX: 28 KG/M2 | DIASTOLIC BLOOD PRESSURE: 48 MMHG | TEMPERATURE: 98 F | RESPIRATION RATE: 18 BRPM | WEIGHT: 164.5 LBS | OXYGEN SATURATION: 93 % | SYSTOLIC BLOOD PRESSURE: 108 MMHG

## 2021-08-07 NOTE — H&P
Woodfordsera Harper  : 3/15/1930  Age 80year old  female patient is admitted to [facility] for rehab and nursing    Admitted to [facility] 21    Chief complaint: weakness, c.diff, PE&DVT    HPI  Pt has had repeated bouts of c.diff. 12/27/2011   • Lumbar spinal stenosis 12/27/2011   • Macular hole    • Miscarriage 1959   • Osteoarthritis, multiple sites 12/27/2011   • Osteoarthrosis, unspecified whether generalized or localized, unspecified site 6/29/2012   • Other screening mammogram OIL-Reaction: intestinal upset  Celebrex [Celecoxib]      Food                        Comment:Preservatives-intestinal upset  Hydromorphone             Latex                   ITCHING  Novocain                    Comment:Reaction: HA's, GI  Nsaids lonnie.  HENT: denies nasal congestion, sinus pain or sore throat;  RESPIRATORY: denies shortness of breath, wheezing or cough   CARDIOVASCULAR:denies chest pain, no palpitations   GI: denies nausea, vomiting, constipation, diarrhea; no rectal bleeding; no h transitioning to coumadin, awaiting INR  Coumadin was increased from 2.5 daily to 7.5 daily recently. This might be too much but she is getting daily INR. Will monitor. 3. C. difficile colitis  On oral vancomycin    4.  Iron deficiency anemia, unspecif

## 2021-08-09 NOTE — TELEPHONE ENCOUNTER
[facility] called back and wish to have three additional signatures on the paperwork.  Paperwork placed back in Dr Bateman-Addy incoming folder

## 2021-08-09 NOTE — TELEPHONE ENCOUNTER
Paperwork received from Dr Kya Velarde, faxed signed documents to [place]. Fax number [phone] confirmed. Copy sent to scanning.

## 2021-08-10 ENCOUNTER — PATIENT MESSAGE (OUTPATIENT)
Dept: INTERNAL MEDICINE CLINIC | Facility: CLINIC | Age: 86
End: 2021-08-10

## 2021-08-10 ENCOUNTER — SNF VISIT (OUTPATIENT)
Dept: INTERNAL MEDICINE CLINIC | Age: 86
End: 2021-08-10

## 2021-08-10 VITALS
TEMPERATURE: 98 F | RESPIRATION RATE: 19 BRPM | DIASTOLIC BLOOD PRESSURE: 72 MMHG | HEART RATE: 93 BPM | SYSTOLIC BLOOD PRESSURE: 142 MMHG | OXYGEN SATURATION: 94 %

## 2021-08-10 DIAGNOSIS — A04.72 C. DIFFICILE COLITIS: Primary | ICD-10-CM

## 2021-08-10 DIAGNOSIS — M25.512 LEFT SHOULDER PAIN, UNSPECIFIED CHRONICITY: ICD-10-CM

## 2021-08-10 DIAGNOSIS — I26.94 MULTIPLE SUBSEGMENTAL PULMONARY EMBOLI WITHOUT ACUTE COR PULMONALE (HCC): ICD-10-CM

## 2021-08-10 DIAGNOSIS — I82.402 ACUTE DEEP VEIN THROMBOSIS (DVT) OF LEFT LOWER EXTREMITY, UNSPECIFIED VEIN (HCC): ICD-10-CM

## 2021-08-10 PROCEDURE — 99308 SBSQ NF CARE LOW MDM 20: CPT | Performed by: NURSE PRACTITIONER

## 2021-08-10 RX ORDER — BUPROPION HYDROCHLORIDE 150 MG/1
150 TABLET ORAL DAILY
COMMUNITY

## 2021-08-10 NOTE — PROGRESS NOTES
Kelly Barr, 3/15/1930, 80year old, female    Chief Complaint:  Patient presents with:   Follow - Up: Increasing weakness/B/L nonocclusive PE and LLE DVT/Recurrent C diff       Subjective:   PMH significant for TIAs, Agitation, Glaucoma, Macular h upper or lower extremity. Weakness R/T recent hospitalization/diagnoses/sequelae; will undergo therapies to rehab and improve strength, endurance and independence w/ ADLs  +left shoulder w/ bulbous edema which, ROM to shoulder joint limited 2/2 pain.   +le shift  · Enalapril 20 mg daily     GERD  · Monitor for sxs     IBS/Diverticulosis  · Monitor for sxs     OA/DDD cervical spine/Lumbar spinal stenosis  · Tylenol 500 mg q 6 hrs prn     COVID 19 Pandemic  · COVID test negative on 7.26.21  · S/P [employer] COVID vac

## 2021-08-11 ENCOUNTER — TELEPHONE (OUTPATIENT)
Dept: INTERNAL MEDICINE CLINIC | Facility: CLINIC | Age: 86
End: 2021-08-11

## 2021-08-11 NOTE — TELEPHONE ENCOUNTER
Received Plan of care addendum and   PT evaluation with correction made on intervention and goals   To Dr. Mitchell Lares for review and signature    To fax back to [phone]  Send for scanning

## 2021-08-11 NOTE — DISCHARGE SUMMARY
[facility]  DISCHARGE SUMMARY     Vijaya Dobbs Patient Status:  Inpatient    3/15/1930 MRN [id number]   [facility] 7NE-A Attending No att. providers found   Hosp Day # 2 PCP Chon Lam MD     Date of Admission: 2021 greatly improved only having a couple bowel movements a day. Patient was cleared for discharge however patient had some altered mental status for which a CT scan was done that showed no acute changes and by the next morning patient was back to baseline.   P MG/0.8ML Soln  Commonly known as: LOVENOX  Ask about: Should I take this medication? Inject 0.75 mL (75 mg total) into the skin every 12 (twelve) hours for 5 days.    Stop taking on: August 1, 2021  Quantity: 7.5 mL  Refills: 1     vancomycin 125 MG Ca

## 2021-08-11 NOTE — TELEPHONE ENCOUNTER
Spoke with Edmond Ervin RN with [facility]  Stated pt is at [facility] s/p recent hospitalization  [facility] is on hold  Requested updated medication list  Edmond Ervin will fax med list as of 7/29/21 to us    Noted recent SNF notes has medication listed in the notes and tentativ

## 2021-08-11 NOTE — TELEPHONE ENCOUNTER
Dr. Tawana Collins, please see pt message below  [id number] [address] to fill out the form of pt needs OV  Form printed     To Dr. Tawana Collins for review

## 2021-08-11 NOTE — TELEPHONE ENCOUNTER
Reviewed form. Needs a physical exam. She was recently hospitalized. I was under the impression that she went to SNF. I have not seen her since the hospitalization. We can fill out the history portion and meds, will need to confirm meds with her.    How

## 2021-08-11 NOTE — TELEPHONE ENCOUNTER
From: Antoni Ramirez  To: Annelise Moon MD  Sent: 8/10/2021 7:05 PM CDT  Subject: Other    Dr Efrain Lopez    We realize that you needed to withdraw as Vivian's primary care physician.  However, we are looking into a move to [address] and ne

## 2021-08-11 NOTE — TELEPHONE ENCOUNTER
Incoming (mail or fax): Fax  Received from:  [facility]  Documentation given to:  Triage incoming bin    Plan of Care- According to a representative from .[facility] there was a adjustment made and needs this order to be signed again.  Plea

## 2021-08-12 ENCOUNTER — SNF VISIT (OUTPATIENT)
Dept: INTERNAL MEDICINE CLINIC | Age: 86
End: 2021-08-12

## 2021-08-12 ENCOUNTER — MED REC SCAN ONLY (OUTPATIENT)
Dept: INTERNAL MEDICINE CLINIC | Facility: CLINIC | Age: 86
End: 2021-08-12

## 2021-08-12 VITALS
DIASTOLIC BLOOD PRESSURE: 63 MMHG | HEART RATE: 73 BPM | OXYGEN SATURATION: 96 % | RESPIRATION RATE: 18 BRPM | SYSTOLIC BLOOD PRESSURE: 164 MMHG | TEMPERATURE: 97 F

## 2021-08-12 DIAGNOSIS — I26.94 MULTIPLE SUBSEGMENTAL PULMONARY EMBOLI WITHOUT ACUTE COR PULMONALE (HCC): ICD-10-CM

## 2021-08-12 DIAGNOSIS — H53.9 VISION CHANGES: ICD-10-CM

## 2021-08-12 DIAGNOSIS — F40.298 FEAR OF FALLING: ICD-10-CM

## 2021-08-12 DIAGNOSIS — I82.402 ACUTE DEEP VEIN THROMBOSIS (DVT) OF LEFT LOWER EXTREMITY, UNSPECIFIED VEIN (HCC): ICD-10-CM

## 2021-08-12 DIAGNOSIS — R53.81 PHYSICAL DECONDITIONING: ICD-10-CM

## 2021-08-12 DIAGNOSIS — A04.72 C. DIFFICILE COLITIS: Primary | ICD-10-CM

## 2021-08-12 DIAGNOSIS — Z78.9 IMPAIRED MOBILITY AND ADLS: ICD-10-CM

## 2021-08-12 DIAGNOSIS — R42 DIZZINESS: ICD-10-CM

## 2021-08-12 DIAGNOSIS — Z74.09 IMPAIRED MOBILITY AND ADLS: ICD-10-CM

## 2021-08-12 PROCEDURE — 99309 SBSQ NF CARE MODERATE MDM 30: CPT | Performed by: NURSE PRACTITIONER

## 2021-08-12 RX ORDER — MECLIZINE HCL 12.5 MG/1
12.5 TABLET ORAL DAILY
COMMUNITY

## 2021-08-12 NOTE — PROGRESS NOTES
Jie Deras, 3/15/1930, 80year old, female    Chief Complaint:  Patient presents with:   Follow - Up: Increasing weakness/B/L nonocclusive PE and LLE DVT/Recurrent C diff  Weakness  Blurry Vision  Anticoagulation       Subjective:   PMH significan S4; , no click, no murmur  ABDOMEN:  normal active BS+, soft, nondistended; no organomegaly, no masses; no bruits; nontender, no guarding, no rebound tenderness.   :Deferred  LYMPHATIC:no lymphedema  MUSCULOSKELETAL: no acute synovitis upper or lower extr increase to 5.5 mg nightly beginning 8.13.21  · Lovenox 80mg/0.8 ml; 75 mg (0.75 ml) q 12--DCd 8.10.21  · Bleeding precautions     Anemia  · Monitor labs  · Last Hgb=11.6-->11.8-->10.9     Hx TIAs  · Monitor     Agitation  · Seroquel 25 mg; half tab (12.5

## 2021-08-16 NOTE — TELEPHONE ENCOUNTER
Made follow up call to receive updated medication list from Annabel Kraft with [address] she will fax medlist today

## 2021-08-16 NOTE — TELEPHONE ENCOUNTER
Received Medication list from [facility]  Form completed as able  To Dr. Lopez  for review/complete and signature    To notify son when form is ready   He will pick it up

## 2021-08-17 ENCOUNTER — SNF DISCHARGE (OUTPATIENT)
Dept: INTERNAL MEDICINE CLINIC | Age: 86
End: 2021-08-17

## 2021-08-17 VITALS
DIASTOLIC BLOOD PRESSURE: 73 MMHG | TEMPERATURE: 98 F | OXYGEN SATURATION: 95 % | HEART RATE: 79 BPM | RESPIRATION RATE: 18 BRPM | SYSTOLIC BLOOD PRESSURE: 155 MMHG

## 2021-08-17 DIAGNOSIS — Z78.9 IMPAIRED MOBILITY AND ADLS: ICD-10-CM

## 2021-08-17 DIAGNOSIS — M15.9 OSTEOARTHRITIS OF MULTIPLE JOINTS, UNSPECIFIED OSTEOARTHRITIS TYPE: ICD-10-CM

## 2021-08-17 DIAGNOSIS — M25.512 LEFT SHOULDER PAIN, UNSPECIFIED CHRONICITY: ICD-10-CM

## 2021-08-17 DIAGNOSIS — I26.94 MULTIPLE SUBSEGMENTAL PULMONARY EMBOLI WITHOUT ACUTE COR PULMONALE (HCC): ICD-10-CM

## 2021-08-17 DIAGNOSIS — D64.9 ANEMIA, UNSPECIFIED TYPE: ICD-10-CM

## 2021-08-17 DIAGNOSIS — A04.72 C. DIFFICILE COLITIS: Primary | ICD-10-CM

## 2021-08-17 DIAGNOSIS — I10 BENIGN ESSENTIAL HTN: ICD-10-CM

## 2021-08-17 DIAGNOSIS — R53.81 PHYSICAL DECONDITIONING: ICD-10-CM

## 2021-08-17 DIAGNOSIS — I82.402 ACUTE DEEP VEIN THROMBOSIS (DVT) OF LEFT LOWER EXTREMITY, UNSPECIFIED VEIN (HCC): ICD-10-CM

## 2021-08-17 DIAGNOSIS — H53.9 VISION CHANGES: ICD-10-CM

## 2021-08-17 DIAGNOSIS — F40.298 FEAR OF FALLING: ICD-10-CM

## 2021-08-17 DIAGNOSIS — Z74.09 IMPAIRED MOBILITY AND ADLS: ICD-10-CM

## 2021-08-17 DIAGNOSIS — R42 DIZZINESS: ICD-10-CM

## 2021-08-17 PROBLEM — H26.492 PCO (POSTERIOR CAPSULAR OPACIFICATION), LEFT: Status: ACTIVE | Noted: 2018-07-31

## 2021-08-17 PROBLEM — H40.1133 PRIMARY OPEN ANGLE GLAUCOMA OF BOTH EYES, SEVERE STAGE: Status: ACTIVE | Noted: 2017-03-21

## 2021-08-17 PROBLEM — I89.0 LYMPHEDEMA: Status: ACTIVE | Noted: 2019-01-02

## 2021-08-17 PROCEDURE — 99316 NF DSCHRG MGMT 30 MIN+: CPT | Performed by: NURSE PRACTITIONER

## 2021-08-17 RX ORDER — VANCOMYCIN HYDROCHLORIDE 25 MG/ML
125 KIT ORAL EVERY 12 HOURS
COMMUNITY
Start: 2021-09-01 | End: 2021-09-15

## 2021-08-17 RX ORDER — VANCOMYCIN HYDROCHLORIDE 25 MG/ML
125 KIT ORAL DAILY
COMMUNITY
Start: 2021-09-16 | End: 2021-09-30

## 2021-08-17 RX ORDER — WARFARIN SODIUM 3 MG/1
3 TABLET ORAL DAILY
COMMUNITY

## 2021-08-17 RX ORDER — VANCOMYCIN HYDROCHLORIDE 25 MG/ML
125 KIT ORAL EVERY 8 HOURS
COMMUNITY
Start: 2021-08-17 | End: 2021-08-31

## 2021-08-17 NOTE — TELEPHONE ENCOUNTER
Medication list updated  Pt is no longer on Seroquel and Lovenox; discontinued  PT completes C. Diff treatment, Marlaine Reyes today so did not add to medication list    Pt's son is aware of completing only history portion of the form   please see note mendez

## 2021-08-17 NOTE — PROGRESS NOTES
Rashaunleona Xavier, 3/15/1930, 80year old, female is being discharged from Facility:  [facility] SUMMARY    Date of Admission:  8.4.21    Date of Discharge:  Anticipated on or before 8.21.21                            Admitting Diag DRAINS:  none  SKIN: no rashes, no suspicious lesions, pale, warm, dry  WOUND:  none  EYES: PERRLA, EOMI, sclera anicteric, conjunctiva normal; there is no nystagmus, no drainage from eyes  HENT: normocephalic; normal nose, no nasal drainage, mucous Andreas Warren management:  Physical Deconditioning/Impaired mobility and ADLs  · Home health CNA/RN/PT/OT eval and tx     Vertigo  · Meclizine 12.5 mg daily prior to PT/OT     Blurry vision/Glaucoma/Macular hole  · F/U w/ opthomalogy after CHELSIE     Dysphagia  · ST eval a

## 2021-08-18 NOTE — PROGRESS NOTES
[facility]  Progress Note     Antoni Ramirez Patient Status:  Inpatient    3/15/1930 MRN [id number]   [facility] 4NW-A Attending Idania Bustos MD   Hosp Day # 1 PCP Annelise Moon MD     Chief Complaint: AMS   S:  Still has hematology 168 hours. Cardiac  No results for input(s): TROP, PBNP in the last 168 hours. Creatinine Kinase  No results for input(s): CK in the last 168 hours. Inflammatory Markers  No results for input(s): CRP, ARIANNE, LDH, DDIMER in the last 168 hours.    Imag hematology

## 2021-08-18 NOTE — TELEPHONE ENCOUNTER
Notified Son  Stated he will  form today or tomorrow  Form placed in folder at     Copy made and sent for scan

## 2021-08-23 ENCOUNTER — PATIENT OUTREACH (OUTPATIENT)
Dept: CASE MANAGEMENT | Age: 86
End: 2021-08-23

## 2021-08-23 NOTE — PROGRESS NOTES
Pt was discharged from [facility] on 8/21/21. Attempted to contact pt for TCM however no answer. Call continued to ring and did not go to a . [facility] to try again at a later time.      Attempted to then call home phone listed and call rang once,

## 2021-08-24 NOTE — PROGRESS NOTES
efish USAt message sent to the pt for TCM. NCM contact information was included in message. TCC contact information was included in [facility] message.

## 2021-09-24 PROBLEM — F09 COGNITIVE DISORDER: Status: ACTIVE | Noted: 2021-09-24

## 2021-09-24 PROBLEM — F29 UNSPECIFIED PSYCHOSIS NOT DUE TO A SUBSTANCE OR KNOWN PHYSIOLOGICAL CONDITION (HCC): Status: ACTIVE | Noted: 2021-09-24

## 2021-12-22 NOTE — ED QUICK NOTES
Chuckie at bs discussing admit options to NH.  Son at bs Minoxidil Counseling: Minoxidil is a topical medication which can increase blood flow where it is applied. It is uncertain how this medication increases hair growth. Side effects are uncommon and include stinging and allergic reactions.

## 2022-02-03 PROBLEM — F02.818 LEWY BODY DEMENTIA WITH BEHAVIORAL DISTURBANCE (HCC): Status: ACTIVE | Noted: 2022-02-03

## 2022-02-03 PROBLEM — F09 COGNITIVE DISORDER: Status: RESOLVED | Noted: 2021-09-24 | Resolved: 2022-02-03

## 2022-02-03 PROBLEM — G31.83 LEWY BODY DEMENTIA WITH BEHAVIORAL DISTURBANCE (HCC): Status: ACTIVE | Noted: 2022-02-03

## 2022-02-03 PROBLEM — F02.81 LEWY BODY DEMENTIA WITH BEHAVIORAL DISTURBANCE (HCC): Status: ACTIVE | Noted: 2022-02-03

## 2022-02-26 ENCOUNTER — HOSPITAL ENCOUNTER (INPATIENT)
Facility: HOSPITAL | Age: 87
LOS: 4 days | Discharge: NURSING, REHAB OR LONG-TERM CARE | DRG: 389 | End: 2022-03-02
Attending: EMERGENCY MEDICINE | Admitting: INTERNAL MEDICINE
Payer: MEDICARE

## 2022-02-26 ENCOUNTER — APPOINTMENT (OUTPATIENT)
Dept: CT IMAGING | Facility: HOSPITAL | Age: 87
DRG: 389 | End: 2022-02-26
Attending: EMERGENCY MEDICINE
Payer: MEDICARE

## 2022-02-26 ENCOUNTER — APPOINTMENT (OUTPATIENT)
Dept: GENERAL RADIOLOGY | Facility: HOSPITAL | Age: 87
DRG: 389 | End: 2022-02-26
Attending: INTERNAL MEDICINE
Payer: MEDICARE

## 2022-02-26 DIAGNOSIS — R10.9 ABDOMINAL PAIN, ACUTE: Primary | ICD-10-CM

## 2022-02-26 DIAGNOSIS — I26.99 PULMONARY EMBOLISM, UNSPECIFIED CHRONICITY, UNSPECIFIED PULMONARY EMBOLISM TYPE, UNSPECIFIED WHETHER ACUTE COR PULMONALE PRESENT (HCC): ICD-10-CM

## 2022-02-26 DIAGNOSIS — I82.4Y2 ACUTE DEEP VEIN THROMBOSIS (DVT) OF PROXIMAL VEIN OF LEFT LOWER EXTREMITY (HCC): ICD-10-CM

## 2022-02-26 DIAGNOSIS — K59.00 OBSTIPATION: ICD-10-CM

## 2022-02-26 DIAGNOSIS — K56.41 FECAL IMPACTION (HCC): ICD-10-CM

## 2022-02-26 PROBLEM — D72.829 LEUKOCYTOSIS: Status: ACTIVE | Noted: 2022-02-26

## 2022-02-26 PROBLEM — N17.9 ACUTE KIDNEY INJURY (HCC): Status: ACTIVE | Noted: 2022-02-26

## 2022-02-26 PROBLEM — R79.89 AZOTEMIA: Status: ACTIVE | Noted: 2022-02-26

## 2022-02-26 PROBLEM — R73.9 HYPERGLYCEMIA: Status: ACTIVE | Noted: 2022-02-26

## 2022-02-26 LAB
ALBUMIN SERPL-MCNC: 2.8 G/DL (ref 3.4–5)
ALBUMIN/GLOB SERPL: 0.6 {RATIO} (ref 1–2)
ALP LIVER SERPL-CCNC: 70 U/L
ALT SERPL-CCNC: 14 U/L
ANION GAP SERPL CALC-SCNC: 6 MMOL/L (ref 0–18)
AST SERPL-CCNC: 13 U/L (ref 15–37)
BASOPHILS # BLD AUTO: 0.04 X10(3) UL (ref 0–0.2)
BASOPHILS NFR BLD AUTO: 0.2 %
BILIRUB UR QL STRIP.AUTO: NEGATIVE
BUN BLD-MCNC: 52 MG/DL (ref 7–18)
CALCIUM BLD-MCNC: 9 MG/DL (ref 8.5–10.1)
CHLORIDE SERPL-SCNC: 105 MMOL/L (ref 98–112)
CO2 SERPL-SCNC: 27 MMOL/L (ref 21–32)
COLOR UR AUTO: YELLOW
CREAT BLD-MCNC: 1.19 MG/DL
EOSINOPHIL # BLD AUTO: 0 X10(3) UL (ref 0–0.7)
EOSINOPHIL NFR BLD AUTO: 0 %
ERYTHROCYTE [DISTWIDTH] IN BLOOD BY AUTOMATED COUNT: 14.9 %
GLOBULIN PLAS-MCNC: 4.4 G/DL (ref 2.8–4.4)
GLUCOSE BLD-MCNC: 160 MG/DL (ref 70–99)
GLUCOSE UR STRIP.AUTO-MCNC: NEGATIVE MG/DL
HCT VFR BLD AUTO: 33.8 %
HGB BLD-MCNC: 11.6 G/DL
HYALINE CASTS #/AREA URNS AUTO: PRESENT /LPF
IMM GRANULOCYTES # BLD AUTO: 0.06 X10(3) UL (ref 0–1)
IMM GRANULOCYTES NFR BLD: 0.4 %
INR BLD: 5.58 (ref 0.8–1.2)
KETONES UR STRIP.AUTO-MCNC: NEGATIVE MG/DL
LEUKOCYTE ESTERASE UR QL STRIP.AUTO: NEGATIVE
LIPASE SERPL-CCNC: 93 U/L (ref 73–393)
LYMPHOCYTES # BLD AUTO: 0.91 X10(3) UL (ref 1–4)
LYMPHOCYTES NFR BLD AUTO: 5.3 %
MCH RBC QN AUTO: 33.5 PG (ref 26–34)
MCHC RBC AUTO-ENTMCNC: 34.3 G/DL (ref 31–37)
MCV RBC AUTO: 97.7 FL
MONOCYTES # BLD AUTO: 1.56 X10(3) UL (ref 0.1–1)
MONOCYTES NFR BLD AUTO: 9.1 %
NEUTROPHILS # BLD AUTO: 14.57 X10 (3) UL (ref 1.5–7.7)
NEUTROPHILS # BLD AUTO: 14.57 X10(3) UL (ref 1.5–7.7)
NEUTROPHILS NFR BLD AUTO: 85 %
NITRITE UR QL STRIP.AUTO: NEGATIVE
OSMOLALITY SERPL CALC.SUM OF ELEC: 303 MOSM/KG (ref 275–295)
PH UR STRIP.AUTO: 5 [PH] (ref 5–8)
PLATELET # BLD AUTO: 351 10(3)UL (ref 150–450)
POTASSIUM SERPL-SCNC: 4 MMOL/L (ref 3.5–5.1)
PROT SERPL-MCNC: 7.2 G/DL (ref 6.4–8.2)
PROT UR STRIP.AUTO-MCNC: NEGATIVE MG/DL
PROTHROMBIN TIME: 51.1 SECONDS (ref 11.6–14.8)
RBC # BLD AUTO: 3.46 X10(6)UL
RBC #/AREA URNS AUTO: >10 /HPF
SARS-COV-2 RNA RESP QL NAA+PROBE: NOT DETECTED
SODIUM SERPL-SCNC: 138 MMOL/L (ref 136–145)
SP GR UR STRIP.AUTO: 1.02 (ref 1–1.03)
UROBILINOGEN UR STRIP.AUTO-MCNC: 2 MG/DL
WBC # BLD AUTO: 17.1 X10(3) UL (ref 4–11)

## 2022-02-26 PROCEDURE — 99223 1ST HOSP IP/OBS HIGH 75: CPT | Performed by: INTERNAL MEDICINE

## 2022-02-26 PROCEDURE — 74177 CT ABD & PELVIS W/CONTRAST: CPT | Performed by: EMERGENCY MEDICINE

## 2022-02-26 PROCEDURE — 74018 RADEX ABDOMEN 1 VIEW: CPT | Performed by: INTERNAL MEDICINE

## 2022-02-26 PROCEDURE — 0DCE7ZZ EXTIRPATION OF MATTER FROM LARGE INTESTINE, VIA NATURAL OR ARTIFICIAL OPENING: ICD-10-PCS | Performed by: EMERGENCY MEDICINE

## 2022-02-26 RX ORDER — SODIUM CHLORIDE 9 MG/ML
INJECTION, SOLUTION INTRAVENOUS CONTINUOUS
Status: DISCONTINUED | OUTPATIENT
Start: 2022-02-26 | End: 2022-02-28

## 2022-02-26 RX ORDER — ENALAPRIL MALEATE 10 MG/1
20 TABLET ORAL DAILY
Status: DISCONTINUED | OUTPATIENT
Start: 2022-02-26 | End: 2022-03-02

## 2022-02-26 RX ORDER — POLYETHYLENE GLYCOL 3350 17 G/17G
17 POWDER, FOR SOLUTION ORAL DAILY
Status: DISCONTINUED | OUTPATIENT
Start: 2022-02-26 | End: 2022-02-27

## 2022-02-26 RX ORDER — PIMAVANSERIN TARTRATE 10 MG/1
20 TABLET, COATED ORAL DAILY
COMMUNITY
Start: 2022-02-21

## 2022-02-26 RX ORDER — LACTULOSE 10 G/15ML
20 SOLUTION ORAL 3 TIMES DAILY
Status: DISCONTINUED | OUTPATIENT
Start: 2022-02-26 | End: 2022-03-02

## 2022-02-26 RX ORDER — ONDANSETRON 2 MG/ML
4 INJECTION INTRAMUSCULAR; INTRAVENOUS EVERY 6 HOURS PRN
Status: DISCONTINUED | OUTPATIENT
Start: 2022-02-26 | End: 2022-03-02

## 2022-02-26 RX ORDER — LATANOPROST 50 UG/ML
1 SOLUTION/ DROPS OPHTHALMIC NIGHTLY
COMMUNITY
Start: 2022-02-25

## 2022-02-26 RX ORDER — IOHEXOL 350 MG/ML
80 INJECTION, SOLUTION INTRAVENOUS
Status: COMPLETED | OUTPATIENT
Start: 2022-02-26 | End: 2022-02-26

## 2022-02-26 RX ORDER — BISACODYL 10 MG
10 SUPPOSITORY, RECTAL RECTAL
Status: DISCONTINUED | OUTPATIENT
Start: 2022-02-26 | End: 2022-03-01

## 2022-02-26 RX ORDER — LATANOPROST 50 UG/ML
1 SOLUTION/ DROPS OPHTHALMIC NIGHTLY
Status: DISCONTINUED | OUTPATIENT
Start: 2022-02-26 | End: 2022-03-02

## 2022-02-26 RX ORDER — DOCUSATE SODIUM 100 MG/1
100 CAPSULE, LIQUID FILLED ORAL 2 TIMES DAILY
Status: DISCONTINUED | OUTPATIENT
Start: 2022-02-26 | End: 2022-03-02

## 2022-02-26 NOTE — ED INITIAL ASSESSMENT (HPI)
Patient arrived from [facility] with abdominal distention x1 day. Had US that showed hydronephrosis. Last BM 2/25.  A/Ox1 baseline

## 2022-02-27 ENCOUNTER — ANESTHESIA (OUTPATIENT)
Dept: ENDOSCOPY | Facility: HOSPITAL | Age: 87
DRG: 389 | End: 2022-02-27
Payer: MEDICARE

## 2022-02-27 ENCOUNTER — ANESTHESIA EVENT (OUTPATIENT)
Dept: ENDOSCOPY | Facility: HOSPITAL | Age: 87
DRG: 389 | End: 2022-02-27
Payer: MEDICARE

## 2022-02-27 LAB
ANION GAP SERPL CALC-SCNC: 7 MMOL/L (ref 0–18)
BASOPHILS # BLD AUTO: 0.04 X10(3) UL (ref 0–0.2)
BASOPHILS NFR BLD AUTO: 0.3 %
BUN BLD-MCNC: 49 MG/DL (ref 7–18)
CALCIUM BLD-MCNC: 9.3 MG/DL (ref 8.5–10.1)
CHLORIDE SERPL-SCNC: 107 MMOL/L (ref 98–112)
CO2 SERPL-SCNC: 28 MMOL/L (ref 21–32)
CREAT BLD-MCNC: 1.08 MG/DL
EOSINOPHIL # BLD AUTO: 0.02 X10(3) UL (ref 0–0.7)
EOSINOPHIL NFR BLD AUTO: 0.2 %
ERYTHROCYTE [DISTWIDTH] IN BLOOD BY AUTOMATED COUNT: 15.1 %
GLUCOSE BLD-MCNC: 106 MG/DL (ref 70–99)
HCT VFR BLD AUTO: 31 %
HGB BLD-MCNC: 10.2 G/DL
IMM GRANULOCYTES # BLD AUTO: 0.07 X10(3) UL (ref 0–1)
IMM GRANULOCYTES NFR BLD: 0.6 %
INR BLD: 5.94 (ref 0.8–1.2)
LYMPHOCYTES # BLD AUTO: 1.28 X10(3) UL (ref 1–4)
LYMPHOCYTES NFR BLD AUTO: 10.5 %
MCH RBC QN AUTO: 33.6 PG (ref 26–34)
MCHC RBC AUTO-ENTMCNC: 32.9 G/DL (ref 31–37)
MCV RBC AUTO: 102 FL
MONOCYTES # BLD AUTO: 1.32 X10(3) UL (ref 0.1–1)
MONOCYTES NFR BLD AUTO: 10.8 %
NEUTROPHILS # BLD AUTO: 9.49 X10 (3) UL (ref 1.5–7.7)
NEUTROPHILS # BLD AUTO: 9.49 X10(3) UL (ref 1.5–7.7)
NEUTROPHILS NFR BLD AUTO: 77.6 %
OSMOLALITY SERPL CALC.SUM OF ELEC: 307 MOSM/KG (ref 275–295)
PLATELET # BLD AUTO: 336 10(3)UL (ref 150–450)
POTASSIUM SERPL-SCNC: 3.8 MMOL/L (ref 3.5–5.1)
PROTHROMBIN TIME: 53.7 SECONDS (ref 11.6–14.8)
RBC # BLD AUTO: 3.04 X10(6)UL
SODIUM SERPL-SCNC: 142 MMOL/L (ref 136–145)
WBC # BLD AUTO: 12.2 X10(3) UL (ref 4–11)

## 2022-02-27 PROCEDURE — 0DCN8ZZ EXTIRPATION OF MATTER FROM SIGMOID COLON, VIA NATURAL OR ARTIFICIAL OPENING ENDOSCOPIC: ICD-10-PCS | Performed by: INTERNAL MEDICINE

## 2022-02-27 PROCEDURE — 99232 SBSQ HOSP IP/OBS MODERATE 35: CPT | Performed by: INTERNAL MEDICINE

## 2022-02-27 RX ORDER — NALOXONE HYDROCHLORIDE 0.4 MG/ML
80 INJECTION, SOLUTION INTRAMUSCULAR; INTRAVENOUS; SUBCUTANEOUS AS NEEDED
Status: DISCONTINUED | OUTPATIENT
Start: 2022-02-27 | End: 2022-02-27 | Stop reason: HOSPADM

## 2022-02-27 RX ORDER — SODIUM CHLORIDE 9 MG/ML
INJECTION, SOLUTION INTRAVENOUS CONTINUOUS
Status: DISCONTINUED | OUTPATIENT
Start: 2022-02-27 | End: 2022-02-27 | Stop reason: HOSPADM

## 2022-02-27 RX ORDER — POLYETHYLENE GLYCOL 3350 17 G/17G
17 POWDER, FOR SOLUTION ORAL 2 TIMES DAILY
Status: DISCONTINUED | OUTPATIENT
Start: 2022-02-27 | End: 2022-03-02

## 2022-02-27 RX ORDER — LIDOCAINE HYDROCHLORIDE 10 MG/ML
INJECTION, SOLUTION EPIDURAL; INFILTRATION; INTRACAUDAL; PERINEURAL AS NEEDED
Status: DISCONTINUED | OUTPATIENT
Start: 2022-02-27 | End: 2022-02-27 | Stop reason: SURG

## 2022-02-27 RX ORDER — PHENYLEPHRINE HCL 10 MG/ML
VIAL (ML) INJECTION AS NEEDED
Status: DISCONTINUED | OUTPATIENT
Start: 2022-02-27 | End: 2022-02-27 | Stop reason: SURG

## 2022-02-27 RX ORDER — ACETAMINOPHEN 325 MG/1
650 TABLET ORAL EVERY 6 HOURS PRN
Status: DISCONTINUED | OUTPATIENT
Start: 2022-02-27 | End: 2022-03-02

## 2022-02-27 RX ORDER — ONDANSETRON 2 MG/ML
4 INJECTION INTRAMUSCULAR; INTRAVENOUS AS NEEDED
Status: DISCONTINUED | OUTPATIENT
Start: 2022-02-27 | End: 2022-02-27 | Stop reason: HOSPADM

## 2022-02-27 RX ADMIN — LIDOCAINE HYDROCHLORIDE 50 MG: 10 INJECTION, SOLUTION EPIDURAL; INFILTRATION; INTRACAUDAL; PERINEURAL at 14:29:00

## 2022-02-27 RX ADMIN — PHENYLEPHRINE HCL 200 MCG: 10 MG/ML VIAL (ML) INJECTION at 14:33:00

## 2022-02-27 RX ADMIN — PHENYLEPHRINE HCL 100 MCG: 10 MG/ML VIAL (ML) INJECTION at 14:59:00

## 2022-02-27 RX ADMIN — SODIUM CHLORIDE: 9 INJECTION, SOLUTION INTRAVENOUS at 15:20:00

## 2022-02-27 NOTE — PLAN OF CARE
NURSING ADMISSION NOTE      1915: Patient admitted via Cart  Oriented to room. Safety precautions initiated. Bed in low position. Call light in reach.

## 2022-02-27 NOTE — ED QUICK NOTES
Orders for admission, patient is aware of plan and ready to go upstairs. Any questions, please call ED RN Lisa at extension [phone]. Patient Covid vaccination status: Partially vaccinated     COVID Test Ordered in ED: Rapid SARS-CoV-2 by PCR    COVID Suspicion at Admission: N/A    Running Infusions:  None    Mental Status/LOC at time of transport: A/Ox1 Baseline.  Dementia     Other pertinent information:  CIWA score: N/A   NIH score:  N/A

## 2022-02-27 NOTE — PHYSICAL THERAPY NOTE
PT orders received, chart reviewed. Discussed with RN, Beck Edge, pt is from [facility] and is alert and oriented x 1 and bed bound at baseline. This writer confirmed with pt son, Naren Rocha, via telephone pt requires katiuska lift and assist for all ADL's and mobility skills at baseline. No skilled therapy warranted at this time. Will sign off, RN is aware.

## 2022-02-27 NOTE — PLAN OF CARE
Pt A&Ox1. Pt back from GI lab, pt has had one loose stool since disimpaction. Upon assessment pt seems more comfortable. IV phytonadione given per Mar. Safety precautions in place, call light in reach. Problem: Patient/Family Goals  Goal: Patient/Family Long Term Goal  Description: Patient's Long Term Goal: Discharge to nursing home    Interventions:  - IVF  -Monitor lab results  -Stool softners  - See additional Care Plan goals for specific interventions  Outcome: Progressing  Goal: Patient/Family Short Term Goal  Description: Patient's Short Term Goal:Patient safety    Interventions:   -Explain to patient what is going on  -Call light education and in reach  - Bed alarm  -Video monitoring  -frequent rounding  - See additional Care Plan goals for specific interventions  Outcome: Progressing     Problem: SAFETY ADULT - FALL  Goal: Free from fall injury  Description: INTERVENTIONS:  - Assess pt frequently for physical needs  - Identify cognitive and physical deficits and behaviors that affect risk of falls.   - [place] fall precautions as indicated by assessment.  - Educate pt/family on patient safety including physical limitations  - Instruct pt to call for assistance with activity based on assessment  - Modify environment to reduce risk of injury  - Provide assistive devices as appropriate  - Consider OT/PT consult to assist with strengthening/mobility  - Encourage toileting schedule  Outcome: Progressing     Problem: GASTROINTESTINAL - ADULT  Goal: Maintains or returns to baseline bowel function  Description: INTERVENTIONS:  - Assess bowel function  - Maintain adequate hydration with IV or PO as ordered and tolerated  - Evaluate effectiveness of GI medications  - Encourage mobilization and activity  - Obtain nutritional consult as needed  - Establish a toileting routine/schedule  - Consider collaborating with pharmacy to review patient's medication profile  Outcome: Progressing     Problem: SKIN/TISSUE INTEGRITY - ADULT  Goal: Skin integrity remains intact  Description: INTERVENTIONS  - Assess and document risk factors for pressure ulcer development  - Assess and document skin integrity  - Monitor for areas of redness and/or skin breakdown  - Initiate interventions, skin care algorithm/standards of care as needed  Outcome: Progressing

## 2022-02-27 NOTE — OPERATIVE REPORT
Colon operative report  Patient Name: Agata Carter  Procedure: Incomplete colonoscopy with manual and endoscopic disimpaction  Date of procedure: 2/27/2022    Indication: Fecal impaction  Date of last colonoscopy: N/A - the current procedure was performed for acute illness  Attending: Delfino Rebollar M.D. Consent: The risks, benefits, and alternatives were discussed with the patient / POA. Risks included, but were not limited to, bleeding, perforation, medication effects, cardiac arrhythmias, missed polyps, and aspiration. After all questions were answered to their satisfaction, a signed, informed, and witnessed consent was obtained. Timeout:  Prior to initiation of sedation, a formal timeout was performed, confirming the patient's name, date of birth, allergies, correct procedure, and need for antibiotics. The operating physician and sedating physician was also confirmed prior to initiation of sedation. Sedation: General endotracheal intubation  Monitoring: Pulsoximetry, pulse, respirations, and blood pressure were monitored throughout the entire procedure    Preparation Quality: N/A - the patient was unprepped           [place] Prep Score:  Right: N/A, Middle: N/A, Left: N/A    Total: N/A  Procedure: After achieving adequate sedation, and placing the patient in the left lateral decubitus position, a digital rectal examination was performed. The lubricated tip of the pediatric colonoscope was then introduced into the rectum and advanced to the mid-sigmoid colon. The endoscope was then carefully withdrawn from the patient with careful visualization of the colonic mucosa revealing no additional pathologic findings. Air was suctioned to the best of my ability, during withdrawal of the endoscope. The endoscope was then removed from the patient. The patient tolerated the procedure without apparent procedural complications.   The patient left the procedure room in stable condition for recovery. Findings:  A digital rectal examination was first performed. There were  external hemorrhoids and a small skin tag. There were no masses, fissures, or fistulae. Soft stool was appreciated filling the rectum. Manual disimpaction was then performed with the extraction of a large amount of stool. The pediatric colonoscope was then inserted into the rectum. A large amount of stool was appreciated. Aggressive washing was then performed so as to break up the stool mass into smaller pieces. The endoscope was able to be negotiated to the sigmoid colon. However, the mucosa was not able to be visualized. Aggressive washing was continued, to both add water proximal to the large fecal stool mass, but also to continue to break up the stool. The endoscope was then withdrawn after suctioning the lumen. Additional manual disimpaction was then performed. The procedure was then completed. At that point, the patient then started to pass a large amount of soft formed stool spontaneously. Impression: Findings as above. Recommendations:    1) Tap water enema every 12 hours   2) Continue PO lactulose tid until she starts having diarrhea (watch for hypernatremia)   3) Continue Miralax, as will need it likely twice daily   4) Ultimately, would benefit from a full 4L golytely prep. However, given her baseline cognitive state, I am skeptical that she would be able to do so.     5) Avoid anticholinergics, antihistamines, and anti-diarrhea agents

## 2022-02-27 NOTE — ANESTHESIA PROCEDURE NOTES
Airway  Date/Time: 2/27/2022 2:30 PM  Urgency: elective      General Information and Staff    Patient location during procedure: OR  Anesthesiologist: Lissett Claire MD  Performed: anesthesiologist     Indications and Patient Condition  Indications for airway management: anesthesia  Sedation level: deep  Preoxygenated: yes  Patient position: sniffing  Mask difficulty assessment: 1 - vent by mask    Final Airway Details  Final airway type: endotracheal airway      Successful airway: ETT  Cuffed: yes   Successful intubation technique: direct laryngoscopy  Facilitating devices/methods: intubating stylet, cricoid pressure and rapid sequence intubation  Endotracheal tube insertion site: oral  Blade size: #3  ETT size (mm): 7.0    Cormack-Lehane Classification: grade I - full view of glottis  Placement verified by: chest auscultation and capnometry   Cuff volume (mL): 7  Measured from: lips  ETT to lips (cm): 21  Number of attempts at approach: 1  Ventilation between attempts: none  Number of other approaches attempted: 0

## 2022-02-27 NOTE — PLAN OF CARE
Upon assessment, pt is alert only to self, vss, Tele-NSR, ivf infusing, incontinent bowel and bladder, bedbound. Family was at bedside on admission and updated on the plan of care, pt is on bed alarm and video monitoring for safety, call light is at reach, will keep monitoring    0653: Hospitalist paged about PT and INR lab result, call back received to confirm Coumadin is on hold    Problem: Patient/Family Goals  Goal: Patient/Family Long Term Goal  Description: Patient's Long Term Goal: Discharge to nursing home    Interventions:  - IVF  -Monitor lab results  -Stool softners  - See additional Care Plan goals for specific interventions  Outcome: Progressing  Goal: Patient/Family Short Term Goal  Description: Patient's Short Term Goal:Patient safety    Interventions:   -Explain to patient what is going on  -Call light education and in reach  - Bed alarm  -Video monitoring  -frequent rounding  - See additional Care Plan goals for specific interventions  Outcome: Progressing     Problem: SAFETY ADULT - FALL  Goal: Free from fall injury  Description: INTERVENTIONS:  - Assess pt frequently for physical needs  - Identify cognitive and physical deficits and behaviors that affect risk of falls.   - [place] fall precautions as indicated by assessment.  - Educate pt/family on patient safety including physical limitations  - Instruct pt to call for assistance with activity based on assessment  - Modify environment to reduce risk of injury  - Provide assistive devices as appropriate  - Consider OT/PT consult to assist with strengthening/mobility  - Encourage toileting schedule  Outcome: Progressing     Problem: GASTROINTESTINAL - ADULT  Goal: Maintains or returns to baseline bowel function  Description: INTERVENTIONS:  - Assess bowel function  - Maintain adequate hydration with IV or PO as ordered and tolerated  - Evaluate effectiveness of GI medications  - Encourage mobilization and activity  - Obtain nutritional consult as needed  - Establish a toileting routine/schedule  - Consider collaborating with pharmacy to review patient's medication profile  Outcome: Progressing     Problem: SKIN/TISSUE INTEGRITY - ADULT  Goal: Skin integrity remains intact  Description: INTERVENTIONS  - Assess and document risk factors for pressure ulcer development  - Assess and document skin integrity  - Monitor for areas of redness and/or skin breakdown  - Initiate interventions, skin care algorithm/standards of care as needed  Outcome: Progressing

## 2022-02-28 ENCOUNTER — APPOINTMENT (OUTPATIENT)
Dept: GENERAL RADIOLOGY | Facility: HOSPITAL | Age: 87
DRG: 389 | End: 2022-02-28
Attending: INTERNAL MEDICINE
Payer: MEDICARE

## 2022-02-28 LAB
ANION GAP SERPL CALC-SCNC: 2 MMOL/L (ref 0–18)
BASOPHILS # BLD AUTO: 0.06 X10(3) UL (ref 0–0.2)
BASOPHILS NFR BLD AUTO: 0.8 %
BUN BLD-MCNC: 41 MG/DL (ref 7–18)
CALCIUM BLD-MCNC: 8.6 MG/DL (ref 8.5–10.1)
CHLORIDE SERPL-SCNC: 114 MMOL/L (ref 98–112)
CO2 SERPL-SCNC: 28 MMOL/L (ref 21–32)
CREAT BLD-MCNC: 0.76 MG/DL
EOSINOPHIL # BLD AUTO: 0.2 X10(3) UL (ref 0–0.7)
EOSINOPHIL NFR BLD AUTO: 2.5 %
ERYTHROCYTE [DISTWIDTH] IN BLOOD BY AUTOMATED COUNT: 14.9 %
GLUCOSE BLD-MCNC: 91 MG/DL (ref 70–99)
HCT VFR BLD AUTO: 29.5 %
HGB BLD-MCNC: 9.3 G/DL
IMM GRANULOCYTES # BLD AUTO: 0.04 X10(3) UL (ref 0–1)
IMM GRANULOCYTES NFR BLD: 0.5 %
INR BLD: 1.38 (ref 0.8–1.2)
LYMPHOCYTES # BLD AUTO: 1.58 X10(3) UL (ref 1–4)
LYMPHOCYTES NFR BLD AUTO: 19.8 %
MCH RBC QN AUTO: 33 PG (ref 26–34)
MCHC RBC AUTO-ENTMCNC: 31.5 G/DL (ref 31–37)
MCV RBC AUTO: 104.6 FL
MONOCYTES # BLD AUTO: 0.9 X10(3) UL (ref 0.1–1)
MONOCYTES NFR BLD AUTO: 11.3 %
NEUTROPHILS # BLD AUTO: 5.19 X10 (3) UL (ref 1.5–7.7)
NEUTROPHILS # BLD AUTO: 5.19 X10(3) UL (ref 1.5–7.7)
NEUTROPHILS NFR BLD AUTO: 65.1 %
OSMOLALITY SERPL CALC.SUM OF ELEC: 308 MOSM/KG (ref 275–295)
PLATELET # BLD AUTO: 301 10(3)UL (ref 150–450)
POTASSIUM SERPL-SCNC: 4.1 MMOL/L (ref 3.5–5.1)
PROTHROMBIN TIME: 17 SECONDS (ref 11.6–14.8)
RBC # BLD AUTO: 2.82 X10(6)UL
SODIUM SERPL-SCNC: 144 MMOL/L (ref 136–145)
WBC # BLD AUTO: 8 X10(3) UL (ref 4–11)

## 2022-02-28 PROCEDURE — 74021 RADEX ABDOMEN 3+ VIEWS: CPT | Performed by: INTERNAL MEDICINE

## 2022-02-28 PROCEDURE — 99232 SBSQ HOSP IP/OBS MODERATE 35: CPT | Performed by: INTERNAL MEDICINE

## 2022-02-28 RX ORDER — WARFARIN SODIUM 2 MG/1
4 TABLET ORAL NIGHTLY
Status: DISCONTINUED | OUTPATIENT
Start: 2022-02-28 | End: 2022-03-02 | Stop reason: DRUGHIGH

## 2022-02-28 NOTE — OCCUPATIONAL THERAPY NOTE
OT orders received per functional mobility screening protocol. Chart reviewed and case discussed w/ nursing team.  Patient is dependent at baseline for all self-care and functional mobility and has appropriate assist at [facility]. Patient transfers with katiuska lift machine. No immediate skilled needs. Will sign off at this time.

## 2022-02-28 NOTE — PLAN OF CARE
Pt alert and orientedx1. Responds to painful stimuli. Bad alarm on. Fall precautions. Confused. Attempted to give medication this shift, patient was unable/unwilling to drink anything. 2 liter of oxygen. Pulse Ox. Tele- NSR. SCDs. Incontinent of bowel and bladder. Mild redness to sacrum, pillow support used, barrier cream applied. IVF infusing. Will continue to monitor. Problem: Patient/Family Goals  Goal: Patient/Family Long Term Goal  Description: Patient's Long Term Goal: Discharge to nursing home    Interventions:  - IVF  -Monitor lab results  -Stool softners  - See additional Care Plan goals for specific interventions  Outcome: Progressing  Goal: Patient/Family Short Term Goal  Description: Patient's Short Term Goal:Patient safety    Interventions:   -Explain to patient what is going on  -Call light education and in reach  - Bed alarm  -Video monitoring  -frequent rounding  - See additional Care Plan goals for specific interventions  Outcome: Progressing     Problem: SAFETY ADULT - FALL  Goal: Free from fall injury  Description: INTERVENTIONS:  - Assess pt frequently for physical needs  - Identify cognitive and physical deficits and behaviors that affect risk of falls.   - [place] fall precautions as indicated by assessment.  - Educate pt/family on patient safety including physical limitations  - Instruct pt to call for assistance with activity based on assessment  - Modify environment to reduce risk of injury  - Provide assistive devices as appropriate  - Consider OT/PT consult to assist with strengthening/mobility  - Encourage toileting schedule  Outcome: Progressing     Problem: GASTROINTESTINAL - ADULT  Goal: Maintains or returns to baseline bowel function  Description: INTERVENTIONS:  - Assess bowel function  - Maintain adequate hydration with IV or PO as ordered and tolerated  - Evaluate effectiveness of GI medications  - Encourage mobilization and activity  - Obtain nutritional consult as needed  - Establish a toileting routine/schedule  - Consider collaborating with pharmacy to review patient's medication profile  Outcome: Progressing     Problem: SKIN/TISSUE INTEGRITY - ADULT  Goal: Skin integrity remains intact  Description: INTERVENTIONS  - Assess and document risk factors for pressure ulcer development  - Assess and document skin integrity  - Monitor for areas of redness and/or skin breakdown  - Initiate interventions, skin care algorithm/standards of care as needed  Outcome: Progressing

## 2022-02-28 NOTE — PLAN OF CARE
Problem: Patient/Family Goals  Goal: Patient/Family Long Term Goal  Description: Patient's Long Term Goal: Discharge to nursing home    Interventions:  - IVF  -Monitor lab results  -Stool softners  - See additional Care Plan goals for specific interventions  Outcome: Progressing  Goal: Patient/Family Short Term Goal  Description: Patient's Short Term Goal:Patient safety    Interventions:   -Explain to patient what is going on  -Call light education and in reach  - Bed alarm  -Video monitoring  -frequent rounding  - See additional Care Plan goals for specific interventions  Outcome: Progressing     Problem: SAFETY ADULT - FALL  Goal: Free from fall injury  Description: INTERVENTIONS:  - Assess pt frequently for physical needs  - Identify cognitive and physical deficits and behaviors that affect risk of falls.   - [place] fall precautions as indicated by assessment.  - Educate pt/family on patient safety including physical limitations  - Instruct pt to call for assistance with activity based on assessment  - Modify environment to reduce risk of injury  - Provide assistive devices as appropriate  - Consider OT/PT consult to assist with strengthening/mobility  - Encourage toileting schedule  Outcome: Progressing     Problem: GASTROINTESTINAL - ADULT  Goal: Maintains or returns to baseline bowel function  Description: INTERVENTIONS:  - Assess bowel function  - Maintain adequate hydration with IV or PO as ordered and tolerated  - Evaluate effectiveness of GI medications  - Encourage mobilization and activity  - Obtain nutritional consult as needed  - Establish a toileting routine/schedule  - Consider collaborating with pharmacy to review patient's medication profile  Outcome: Progressing     Problem: SKIN/TISSUE INTEGRITY - ADULT  Goal: Skin integrity remains intact  Description: INTERVENTIONS  - Assess and document risk factors for pressure ulcer development  - Assess and document skin integrity  - Monitor for areas of redness and/or skin breakdown  - Initiate interventions, skin care algorithm/standards of care as needed  Outcome: Progressing

## 2022-03-01 ENCOUNTER — APPOINTMENT (OUTPATIENT)
Dept: GENERAL RADIOLOGY | Facility: HOSPITAL | Age: 87
DRG: 389 | End: 2022-03-01
Attending: INTERNAL MEDICINE
Payer: MEDICARE

## 2022-03-01 ENCOUNTER — APPOINTMENT (OUTPATIENT)
Dept: GENERAL RADIOLOGY | Facility: HOSPITAL | Age: 87
DRG: 389 | End: 2022-03-01
Attending: PHYSICIAN ASSISTANT
Payer: MEDICARE

## 2022-03-01 ENCOUNTER — APPOINTMENT (OUTPATIENT)
Dept: ULTRASOUND IMAGING | Facility: HOSPITAL | Age: 87
DRG: 389 | End: 2022-03-01
Attending: INTERNAL MEDICINE
Payer: MEDICARE

## 2022-03-01 LAB
HGB BLD-MCNC: 8.9 G/DL
INR BLD: 1.37 (ref 0.8–1.2)
PROTHROMBIN TIME: 16.9 SECONDS (ref 11.6–14.8)

## 2022-03-01 PROCEDURE — 76770 US EXAM ABDO BACK WALL COMP: CPT | Performed by: INTERNAL MEDICINE

## 2022-03-01 PROCEDURE — 74018 RADEX ABDOMEN 1 VIEW: CPT | Performed by: INTERNAL MEDICINE

## 2022-03-01 PROCEDURE — 74230 X-RAY XM SWLNG FUNCJ C+: CPT | Performed by: INTERNAL MEDICINE

## 2022-03-01 PROCEDURE — 99232 SBSQ HOSP IP/OBS MODERATE 35: CPT | Performed by: INTERNAL MEDICINE

## 2022-03-01 PROCEDURE — 71045 X-RAY EXAM CHEST 1 VIEW: CPT | Performed by: PHYSICIAN ASSISTANT

## 2022-03-01 RX ORDER — BISACODYL 10 MG
10 SUPPOSITORY, RECTAL RECTAL 2 TIMES DAILY
Status: DISCONTINUED | OUTPATIENT
Start: 2022-03-01 | End: 2022-03-02

## 2022-03-01 NOTE — PLAN OF CARE
1620: 100mls urine out in [place], , per Dr. Rosalee Butler straight cath if >400, will continue to monitor. Sm amnt stool. This RN spoke with dtr at bedside and dtr stated family can bring in [address] [facility] [place] [facility] papers to have on file. Hospitalist PA made aware of chest xr and abd xr results as well.      1320:  Pt voided sm amnt this am, bladder scan now read 270 mls, hospitalist PA Romayne Bigness notified and continue with PVR Q6.  Per speech recommendations following video swallow, okay to upgrade to thin liquids. PA aware of results and orders given to do so. Awaiting KUB and chest XR. Pt Alert to self. O2 sats WNL, turned down to 1L from 3L nasal cannula. No evidence difficulty breathing/shortness of breath. Chest XR ordered for today. Soft diet, nectar thick liquids, pt is 1:1 feed. Video swallow done today. Renal US completed, PA aware of results. Tap water enema administered as ordered. Pt unable to hold in, but sm amnt stool out and pt did have 1 sm bowel movement prior to administration. Pt refusing lactulose, but does take colace and miralax. KUB ordered for today. Pt straight cathed x's 2 overnight, will bladder scan again after lunch. Will continue to reposition pt at least every 2 hrs in bed; heels elevated off mattress, scds in place and on.  Pt's son and Reed Palacios at bedside and updated on plan of care. Bed alarm on and video monitoring for safety. Will continue hourly and prn safety rounds.     Problem: Patient/Family Goals  Goal: Patient/Family Long Term Goal  Description: Patient's Long Term Goal: Discharge to nursing home    Interventions:  - IVF  -Monitor lab results  -Stool softners  - See additional Care Plan goals for specific interventions  Outcome: Progressing  Goal: Patient/Family Short Term Goal  Description: Patient's Short Term Goal:Patient safety    Interventions:   -Explain to patient what is going on  -Call light education and in reach  - Bed alarm  -Video monitoring  -frequent rounding  - See additional Care Plan goals for specific interventions  Outcome: Progressing     Problem: SAFETY ADULT - FALL  Goal: Free from fall injury  Description: INTERVENTIONS:  - Assess pt frequently for physical needs  - Identify cognitive and physical deficits and behaviors that affect risk of falls.   - [place] fall precautions as indicated by assessment.  - Educate pt/family on patient safety including physical limitations  - Instruct pt to call for assistance with activity based on assessment  - Modify environment to reduce risk of injury  - Provide assistive devices as appropriate  - Consider OT/PT consult to assist with strengthening/mobility  - Encourage toileting schedule  Outcome: Progressing     Problem: GASTROINTESTINAL - ADULT  Goal: Maintains or returns to baseline bowel function  Description: INTERVENTIONS:  - Assess bowel function  - Maintain adequate hydration with IV or PO as ordered and tolerated  - Evaluate effectiveness of GI medications  - Encourage mobilization and activity  - Obtain nutritional consult as needed  - Establish a toileting routine/schedule  - Consider collaborating with pharmacy to review patient's medication profile  Outcome: Progressing     Problem: SKIN/TISSUE INTEGRITY - ADULT  Goal: Skin integrity remains intact  Description: INTERVENTIONS  - Assess and document risk factors for pressure ulcer development  - Assess and document skin integrity  - Monitor for areas of redness and/or skin breakdown  - Initiate interventions, skin care algorithm/standards of care as needed  Outcome: Progressing

## 2022-03-01 NOTE — CM/SW NOTE
Department  notified of request for claudia HOLGUIN referrals started. Assigned CM/SW to follow up with pt/family on further discharge planning.        Sylvester Number   March 01, 2022   14:21

## 2022-03-01 NOTE — CM/SW NOTE
03/01/22 1337   CM/SW Referral Data   Referral Source    Reason for Referral Discharge planning   Informant Son  ([phone] )   Patient [address]   Post Acute Care Provider Upon Admission [facility]   Discharge Needs   Anticipated D/C needs Transportation services   Choice of Post-Acute Provider   Informed patient of right to choose their preferred provider Yes   List of appropriate post-acute services provided to patient/family with quality data No - Declined list     Call to son to confirm patient dc plan: return to [facility], will need bls setup. CM requested department  [facility]) to initiate [address] referral for LTC at [facility]. MIGUEL/GAURAV will continue to follow.      Jaylon Fairbanks RN,   [id number]

## 2022-03-01 NOTE — CM/SW NOTE
BPCI-Advanced Medicare Program Note:  Plan of care reviewed for care coordination and discharge planning. Noted patient falls under  BPCI/Medicare program, with  for gastrointestinal obstruction. GENEVA tool was used to help determine next care setting. Thus, [address] recommendations is for home [facility]) pending patient progress. Case Management team is following for care coordination and discharge planning needs.

## 2022-03-01 NOTE — PLAN OF CARE
Pt alert and orientedx1. Confused. Visual/auditory hallucinations at times. More awake then last night. Bed alarm. 2 liters of oxygen. Pulse Ox. Tele- NSR. On coumadin. Refuse to take lactulose. Thicken liquids. SCDs. Incontinent of bowl and bladder. Abdomen soft, rounded. Bowel sounds active. Saline locked. Bladder scanned @ 2330 with results of <275. No complaints of pain. Will continue to monitor. Problem: Patient/Family Goals  Goal: Patient/Family Long Term Goal  Description: Patient's Long Term Goal: Discharge to nursing home    Interventions:  - IVF  -Monitor lab results  -Stool softners  - See additional Care Plan goals for specific interventions  Outcome: Progressing  Goal: Patient/Family Short Term Goal  Description: Patient's Short Term Goal:Patient safety    Interventions:   -Explain to patient what is going on  -Call light education and in reach  - Bed alarm  -Video monitoring  -frequent rounding  - See additional Care Plan goals for specific interventions  Outcome: Progressing     Problem: SAFETY ADULT - FALL  Goal: Free from fall injury  Description: INTERVENTIONS:  - Assess pt frequently for physical needs  - Identify cognitive and physical deficits and behaviors that affect risk of falls.   - [place] fall precautions as indicated by assessment.  - Educate pt/family on patient safety including physical limitations  - Instruct pt to call for assistance with activity based on assessment  - Modify environment to reduce risk of injury  - Provide assistive devices as appropriate  - Consider OT/PT consult to assist with strengthening/mobility  - Encourage toileting schedule  Outcome: Progressing     Problem: GASTROINTESTINAL - ADULT  Goal: Maintains or returns to baseline bowel function  Description: INTERVENTIONS:  - Assess bowel function  - Maintain adequate hydration with IV or PO as ordered and tolerated  - Evaluate effectiveness of GI medications  - Encourage mobilization and activity  - Obtain nutritional consult as needed  - Establish a toileting routine/schedule  - Consider collaborating with pharmacy to review patient's medication profile  Outcome: Progressing     Problem: SKIN/TISSUE INTEGRITY - ADULT  Goal: Skin integrity remains intact  Description: INTERVENTIONS  - Assess and document risk factors for pressure ulcer development  - Assess and document skin integrity  - Monitor for areas of redness and/or skin breakdown  - Initiate interventions, skin care algorithm/standards of care as needed  Outcome: Progressing

## 2022-03-01 NOTE — PLAN OF CARE
I called pt Son Maddi Soriano with update, progress with constipation/urinary retention. He reports he has been bringing in [address] and that she will need a refill in next day or so. He is also concerned about this medication adding to her constipation issues. He is requesting psych re-eval to reassess medication mgmt for dementia w/ behavior disturbance. Reviewed w/ MD and psych consult ordered.        RONEY Maher  3:51 PM

## 2022-03-02 VITALS
RESPIRATION RATE: 18 BRPM | WEIGHT: 165.38 LBS | HEART RATE: 94 BPM | DIASTOLIC BLOOD PRESSURE: 60 MMHG | TEMPERATURE: 98 F | SYSTOLIC BLOOD PRESSURE: 145 MMHG | OXYGEN SATURATION: 94 % | BODY MASS INDEX: 28 KG/M2

## 2022-03-02 LAB — PROTHROMBIN TIME: 18.6 SECONDS (ref 11.6–14.8)

## 2022-03-02 PROCEDURE — 99239 HOSP IP/OBS DSCHRG MGMT >30: CPT | Performed by: HOSPITALIST

## 2022-03-02 PROCEDURE — 90792 PSYCH DIAG EVAL W/MED SRVCS: CPT | Performed by: OTHER

## 2022-03-02 RX ORDER — WARFARIN SODIUM 2 MG/1
4 TABLET ORAL
Status: DISCONTINUED | OUTPATIENT
Start: 2022-03-02 | End: 2022-03-02

## 2022-03-02 RX ORDER — POLYETHYLENE GLYCOL 3350 17 G/17G
17 POWDER, FOR SOLUTION ORAL 2 TIMES DAILY
Qty: 60 PACKET | Refills: 0 | Status: SHIPPED | OUTPATIENT
Start: 2022-03-02 | End: 2022-04-01

## 2022-03-02 RX ORDER — LACTULOSE 10 G/15ML
20 SOLUTION ORAL 3 TIMES DAILY PRN
Qty: 900 ML | Refills: 0 | Status: SHIPPED | OUTPATIENT
Start: 2022-03-02

## 2022-03-02 RX ORDER — MAGNESIUM CARB/ALUMINUM HYDROX 105-160MG
296 TABLET,CHEWABLE ORAL ONCE
Status: COMPLETED | OUTPATIENT
Start: 2022-03-02 | End: 2022-03-02

## 2022-03-02 RX ORDER — BISACODYL 10 MG
10 SUPPOSITORY, RECTAL RECTAL DAILY
Qty: 4 SUPPOSITORY | Refills: 0 | Status: SHIPPED | OUTPATIENT
Start: 2022-03-02 | End: 2022-03-06

## 2022-03-02 NOTE — PLAN OF CARE
Patient is alert and confused, non-cooperative with nursing care, Remains on room air, oxygenating well, VSS, appears comfortable when at rest. Stage 1 in the right sacral area, has urinary retention, constipated, unable to tolerate enemas. Took laxatives as ordered last night. No BM at this time yet. Straight cath as ordered. Turned and repositioned. Will continue to monitor. Problem: Patient/Family Goals  Goal: Patient/Family Long Term Goal  Description: Patient's Long Term Goal: Discharge to nursing home    Interventions:  - IVF  -Monitor lab results  -Stool softners  - See additional Care Plan goals for specific interventions  Outcome: Progressing  Goal: Patient/Family Short Term Goal  Description: Patient's Short Term Goal:Patient safety    Interventions:   -Explain to patient what is going on  -Call light education and in reach  - Bed alarm  -Video monitoring  -frequent rounding  - See additional Care Plan goals for specific interventions  Outcome: Progressing     Problem: SAFETY ADULT - FALL  Goal: Free from fall injury  Description: INTERVENTIONS:  - Assess pt frequently for physical needs  - Identify cognitive and physical deficits and behaviors that affect risk of falls.   - [place] fall precautions as indicated by assessment.  - Educate pt/family on patient safety including physical limitations  - Instruct pt to call for assistance with activity based on assessment  - Modify environment to reduce risk of injury  - Provide assistive devices as appropriate  - Consider OT/PT consult to assist with strengthening/mobility  - Encourage toileting schedule  Outcome: Progressing     Problem: GASTROINTESTINAL - ADULT  Goal: Maintains or returns to baseline bowel function  Description: INTERVENTIONS:  - Assess bowel function  - Maintain adequate hydration with IV or PO as ordered and tolerated  - Evaluate effectiveness of GI medications  - Encourage mobilization and activity  - Obtain nutritional consult as needed  - Establish a toileting routine/schedule  - Consider collaborating with pharmacy to review patient's medication profile  Outcome: Progressing     Problem: SKIN/TISSUE INTEGRITY - ADULT  Goal: Skin integrity remains intact  Description: INTERVENTIONS  - Assess and document risk factors for pressure ulcer development  - Assess and document skin integrity  - Monitor for areas of redness and/or skin breakdown  - Initiate interventions, skin care algorithm/standards of care as needed  Outcome: Progressing

## 2022-03-02 NOTE — SLP NOTE
SPEECH DAILY NOTE - INPATIENT    ASSESSMENT & PLAN   ASSESSMENT  Pt is a 79 y/o female seen today to monitor diet tolerance and assess for possible advancement. Pt participated in a VFSS yesterday. Results showed pt presented with mild pharyngeal dysphagia characterized by delayed pharyngeal swallow initiation resulting in penetration before the swallow with thin and mildly thick liquids. SLP recommended a soft and bite-sized diet and an upgrade to thin liquids. Pt received alert, but confused. Pt participated in PO intake of thin liquids and regular solids. SLP instructed pt how to perform a bolus hold in order to aid in delayed pharyngeal swallow initiation with thin liquids. Pt advanced from moderate to mild verbal cues in regards to completing the bolus hold. Bolus acceptance intact without evidence of anterior bolus loss. Mastication and AP transit were thorough and efficient. No overt s/s of aspiration observed throughout. Recommend pt advance to a regular diet and thin liquids. Pt should receive mild verbal cues to aid in use of compensatory strategies i.e. bolus hold. SLP will continue to follow up to monitor diet tolerance and participation in compensatory strategies. Education provided re: recommendations. Diet Recommendations - Solids: Regular  Diet Recommendations - Liquids: Thin Liquids    Compensatory Strategies Recommended: Small bites and sips (bolus hold with thin liquids)  Aspiration Precautions: Upright position  Medication Administration Recommendations: Whole in puree                     Discharge Recommendations  Discharge Recommendations/Plan: Skilled nursing facility    Treatment Plan  Treatment Plan/Recommendations: Dysphagia therapy    Interdisciplinary Communication: Discussed with RN            GOALS  Goal #1 The patient will tolerate soft and bite-sized consistency and thin liquids without overt signs or symptoms of aspiration with 90 % accuracy over 2-3 session(s).   Met Goal #2 The patient/family/caregiver will demonstrate understanding and implementation of aspiration precautions and swallow strategies independently over 2-3 session(s). In Progress   Goal #3 The patient will utilize compensatory strategies as outlined by  VFSS including Slow rate, No straws with mild assistance 90 % of the time across 2 sessions. In Progress   Goal #4 The patient will tolerate regular consistency and thin liquids without overt signs or symptoms of aspiration with 90 % accuracy over 2 session(s). In Progress   FOLLOW UP  Follow Up Needed (Documentation Required): Yes  SLP Follow-up Date: 03/04/22  Number of Visits to Meet Established Goals:  (2-3)    Session: 1    Prior to entering room, SLP donned appropriate PPE for Patient level of isolation including gown, gloves, eye protection, and surgical mask. Patient was not masked due to nature of visit.       If you have any questions, please contact [place]

## 2022-03-02 NOTE — PLAN OF CARE
Pt discharged back to [facility]. Report called to Kate Patel, RN and this RN went over discharge instructions as well. Glasses on patient at time of discharge. Pt voided again and had another soft BM at discharge, cleaned and changed prior to leaving. Pt's son Heath Rain at bedside at time of discharge. Pt transferred in stable condition via ambulance. 1125:  Pt had scant amnt urine with another loose, sm BM. Bladder scanned and highest was 117. Jessica Miranda with hospitalist notified at bedside while rounding. This RN asked if we should continue to straight cath if PVR >400 and Jessica Miranda stated to call first.    Pt A&O x's 1, VSS, O2 sats normal on rm air. Tele NSR. SCDs are in place and on. Pt refusing enemas since last night. Able to drink an entire bottle of Mg Citrate and was also agreeable to drink lactulose and miralax. Dulcolax suppository administered this am.  Pt had 1 loose BM this am. Belly is rounded, but soft, no complaints of pain. Bottom reddened, no breakdown, barrier cream for protection. Pt takes pills whole with applesauce. [place] catheter in place. Pt did have to be straight cathed x's 1 overnight, will monitor outpt and do PVR after void. Speech therapy re-evaluated at bedside and pt doing well with thin liquids, also does not need chopped anymore, maintain 1:1 feed for aspiration precautions. Dr. Mehdi Clark, psych, also reviewed pt meds and stated to cont nuplazid as the benefits outweigh the possible side-effects of constipation. Call light and belongings within reach, bed alarm and video monitoring for safety. Will continue hourly and prn safety rounds.   Problem: Patient/Family Goals  Goal: Patient/Family Long Term Goal  Description: Patient's Long Term Goal: Discharge to nursing home    Interventions:  - IVF  -Monitor lab results  -Stool softners  - See additional Care Plan goals for specific interventions  Outcome: Progressing  Goal: Patient/Family Short Term Goal  Description: Patient's Short Term Goal:Patient safety    Interventions:   -Explain to patient what is going on  -Call light education and in reach  - Bed alarm  -Video monitoring  -frequent rounding  - See additional Care Plan goals for specific interventions  Outcome: Progressing     Problem: SAFETY ADULT - FALL  Goal: Free from fall injury  Description: INTERVENTIONS:  - Assess pt frequently for physical needs  - Identify cognitive and physical deficits and behaviors that affect risk of falls.   - [place] fall precautions as indicated by assessment.  - Educate pt/family on patient safety including physical limitations  - Instruct pt to call for assistance with activity based on assessment  - Modify environment to reduce risk of injury  - Provide assistive devices as appropriate  - Consider OT/PT consult to assist with strengthening/mobility  - Encourage toileting schedule  Outcome: Progressing     Problem: GASTROINTESTINAL - ADULT  Goal: Maintains or returns to baseline bowel function  Description: INTERVENTIONS:  - Assess bowel function  - Maintain adequate hydration with IV or PO as ordered and tolerated  - Evaluate effectiveness of GI medications  - Encourage mobilization and activity  - Obtain nutritional consult as needed  - Establish a toileting routine/schedule  - Consider collaborating with pharmacy to review patient's medication profile  Outcome: Progressing     Problem: SKIN/TISSUE INTEGRITY - ADULT  Goal: Skin integrity remains intact  Description: INTERVENTIONS  - Assess and document risk factors for pressure ulcer development  - Assess and document skin integrity  - Monitor for areas of redness and/or skin breakdown  - Initiate interventions, skin care algorithm/standards of care as needed  Outcome: Progressing

## 2022-03-02 NOTE — CONSULTS
[facility] Dosing Service  Warfarin (Coumadin) Initial Dosing    Kitty Carbajal is a 80year old patient for whom pharmacy has been consulted to dose warfarin (COUMADIN) for  history of DVT & PE  by RONEY Yoon. Based on this indication, goal INR is 2-3. Pertinent Patient Medical History:  Pt admitted from NH w/hx DVT/PE  Potential Drug Interactions:  none    Latest INR:   Recent Labs     03/02/22  0530   INR 1.55*       Other Anticoagulants:  none  Home regimen (if applicable):  Dose was increased to warfarin 6 mg daily ~2/19/22 & pt admitted w/ INR=5.58   Date/Time last dose was given (if applicable): 1/2/95 warfarin 4 mg per RONEY Yoon    Based on above -  1. For today, Give warfarin (COUMADIN) 4 mg at 2100 tonight    2. PT/INR ordered daily while on warfarin    3. Pharmacy will continue to follow. We appreciate the opportunity to assist in the care of this patient.     Sydni Pérez, PharmD  3/2/2022  8:55 AM

## 2022-03-02 NOTE — PROGRESS NOTES
Dr. Carleen Glass made aware that patient refused anymore enemas and would rather take the oral laxatives, orders obtained for mag citrate X1.

## 2022-03-02 NOTE — CM/SW NOTE
03/02/22 0800   Discharge disposition   Expected discharge disposition [facility] [place] Provider [facility]   Discharge transportation [employer] [employer] [facility] to [place] [facility], confirmed patient return today. Updates sent via aidin. RN to call report to [phone]. [facility] [phone] has been requested to arrange ambulance for  time of 5pm. PCS form completed for rn to print. Call to cj Scott to inform.     Iraida Broderick, RN,   [id number]

## 2022-03-02 NOTE — PROGRESS NOTES
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 Personality Traits:  Deferred     Pertinent Medical Diagnoses:  Severe constipation and fecal impaction. Recommendations:  1) Continue Nuplazid at 20mg daily to help with her severe psychosis. Discussed benefits/risks with daughter. The benefits of less delusions and hallucinations and anxiety and distress outweigh the possibly increased risk for constipation. Constipation can be treated with the bowel regimen rec by GI. 2) She can go back to the nursing home from the psych perspective. Her jm-psychiatrist goes to the nursing home. Full note to follow.     Dr. Lucia Graves

## 2022-03-02 NOTE — DISCHARGE SUMMARY
[facility]ITAL[facility]  DISCHARGE SUMMARY     Renetta Dumas Patient Status:  Inpatient    3/15/1930 MRN [id number]   [facility] 3NW-A Attending Rebecca Hammer MD   Hosp Day # 4 PCP No primary care provider on file. Date of Admission: 2022  Date of Discharge: 3/2/2022   Discharge Disposition: [facility]    Discharge Diagnosis:   Constipation with obstipation, fecal impaction with abdominal pain/distention s/p Incomplete colonoscopy with manual and endoscopic disimpaction with Dr. Leonila Lagos 22  Urinary retention with b/l Hydronephrosis 2/2 above, resolved         Lewy body dementia with history of behavioral disturbance, depression  Essential hypertension  Dysphagia  Hypoxia 2/2 atelectasis, improved  Osteoarthritis  History of DVT and PE 2021  Supratherapeutic INR >5 on admission  Glaucoma  Deconditioning      History of Present Illness: Renetta Dumas is a 80year old female admitted with abdominal pain, constipation. Patient with dementia and is a poor historian. Patient son at bedside helping with history. Patient lives at [facility] and has severe Lewy body dementia according to patient's son. Patient has been complaining of some abdominal pain for last 4 days according to patient's son. Patient noted to have some abdominal distention since yesterday as reported by patient's son. Patient noted to have urinary retention in ER. CT of the abdomen and pelvis done in emergency room showed fecal impaction which may be causing bilateral hydronephrosis right greater than left. Manual disimpaction done in emergency room with significant amount of stool at bedside, but large amount of stool still remains and admitted for obstipation    Brief Synopsis: Patient is a 80-year-old female presented with abdominal pain distention and constipation. She was noted to have fecal impaction, severe constipation with bilateral hydronephrosis.   She was manually disimpacted in the ER and then admitted for further work-up. GI was consulted. She underwent C-scope with manual and endoscopic disimpaction on 2/27. She continued on oral bowel care, scheduled enemas. Patient passing BMs now. She is tolerating p.o. intake and abdominal distention resolving. Urinary retention resolved. She will continue on bowel care as directed by GI. Coumadin resumed without event and f/u INR tomorrow. She was discharged to [facility] once cleared by consultants. Lace+ Score: 69  59-90 High Risk  29-58 Medium Risk  0-28   Low Risk         TCM Follow-Up Recommendation:  LACE > 58: High Risk of readmission after discharge from the hospital.     Procedures during hospitalization:   2/27 Dr. Kelsey Members - Incomplete colonoscopy with manual and endoscopic disimpaction    Consultants: GI         Discharge Medications        START taking these medications        Instructions Prescription details   bisacodyl 10 MG Supp  Commonly known as: DULCOLAX      Place 1 suppository (10 mg total) rectally daily for 4 days. Stop taking on: March 6, 2022  Quantity: 4 suppository  Refills: 0     lactulose 10 GM/15ML Soln  Commonly known as: CHRONULAC      Take 30 mL (20 g total) by mouth 3 (three) times daily as needed (constipation). Quantity: 900 mL  Refills: 0     Polyethylene Glycol 3350 17 g Pack  Commonly known as: MIRALAX      Take 17 g by mouth 2 (two) times a day. Stop taking on: April 1, 2022  Quantity: 60 packet  Refills: 0            CONTINUE taking these medications        Instructions Prescription details   acetaminophen 500 MG Tabs  Commonly known as: TYLENOL EXTRA STRENGTH      Take 500 mg by mouth every 6 (six) hours as needed for Pain. Refills: 0     Acidophilus/Pectin Caps  Commonly known as: PROBIOTIC      Take 1 capsule by mouth daily. Refills: 0     enalapril 20 MG Tabs  Commonly known as: VASOTEC      Take 20 mg by mouth daily.    Refills: 0     latanoprost 0.005 % Soln  Commonly known as: [address] 1 drop into both eyes at bedtime. Refills: 0     Latanoprostene Bunod 0.024 % Soln      Place 1 drop into both eyes nightly. Refills: 0     Nuplazid 10 MG Tabs  Generic drug: Pimavanserin Tartrate      Take 20 mg by mouth daily. Refills: 0     warfarin 3 MG Tabs  Commonly known as: COUMADIN      Take as directed. If you are unsure how to take this medication, talk to your nurse or doctor. Original instructions: Take 6 mg by mouth daily. Refills: 0               Where to Get Your Medications        Please  your prescriptions at the location directed by your doctor or nurse    Bring a paper prescription for each of these medications  bisacodyl 10 MG Supp  lactulose 10 GM/15ML Soln  Polyethylene Glycol 3350 17 g Pack         [place]    Follow-up appointment:   primary care physician    Schedule an appointment as soon as possible for a visit in 1 week      -----------------------------------------------------------------------------------------------  PATIENT DISCHARGE INSTRUCTIONS: See electronic chart    RONEY Wild    Addendum:    Patient seen and examined independently. Agree with above except as otherwise noted.       Gen: NAD  CVS: s1s2  Resp: CTA  Abd: jennifer Aguirre MD    Time spent:  > 30 minutes

## 2022-03-02 NOTE — PLAN OF CARE
Called son w/ updated on bowel care, follow up INR tomorrow, and dc to NH today. Primary outpatient psych will send refill for [facility] and Dr. Christina Saldana communicated with them today.       RONEY Marquez  2:09 PM

## 2022-03-31 ENCOUNTER — ANTI-COAG VISIT (OUTPATIENT)
Dept: HEMATOLOGY/ONCOLOGY | Facility: HOSPITAL | Age: 87
End: 2022-03-31

## 2022-12-02 NOTE — PLAN OF CARE
RECD ALERT ,AWAKE ORIENTED. REPOSITIONED IN BED FOR BF. NO RESP DISTRESS. RAHAT ELEVATED ON PILLOWS. CALL LIGHT W/IN REACH. BED ALARM ENGAGED.  TO CALL FOR ALL NEEDS AND ASSISTANCE Walk in

## 2025-04-02 NOTE — PLAN OF CARE
Pt alert and oreinted x3-4, forgetful. On room air. Denies pain. Still having multiple loose BM. US BLE showed LLE DVT, Dr. Tiny Issa notified. Echo results pending. Plan for lovenox and coumadin bridge. Lovenox $87. Iron infusion x1. Possible dc tomorrow. R knee

## (undated) DIAGNOSIS — I82.4Y2 ACUTE DEEP VEIN THROMBOSIS (DVT) OF PROXIMAL VEIN OF LEFT LOWER EXTREMITY (HCC): ICD-10-CM

## (undated) DEVICE — 3M™ RED DOT™ MONITORING ELECTRODE WITH FOAM TAPE AND STICKY GEL, 50/BAG, 20/CASE, 72/PLT 2570: Brand: RED DOT™

## (undated) DEVICE — 1200CC GUARDIAN II: Brand: GUARDIAN

## (undated) DEVICE — FILTERLINE NASAL ADULT O2/CO2

## (undated) DEVICE — Device: Brand: DEFENDO AIR/WATER/SUCTION AND BIOPSY VALVE

## (undated) DEVICE — ENDOSCOPY PACK - LOWER: Brand: MEDLINE INDUSTRIES, INC.

## (undated) NOTE — ED AVS SNAPSHOT
Dorian Doeita   MRN: [id number]    Department:  [facility] Emergency Department   Date of Visit:  10/13/2019           Disclosure     Insurance plans vary and the physician(s) referred by the ER may not be covered by your plan.  Please conta tell this physician (or your personal doctor if your instructions are to return to your personal doctor) about any new or lasting problems. The primary care or specialist physician will see patients referred from the [facility] Emergency Department.  Dennis Goyal

## (undated) NOTE — LETTER
04/18/18        [address]IME [address]  [address] [facility] [address]    3/15/1930     Dear Anthony Chilel,    [facility] records indicate that you have outstanding lab work and or testing that was ordered for you and has not yet been completed:          Lip

## (undated) NOTE — ED AVS SNAPSHOT
Magda More   MRN: [id number]    Department:  [facility] Emergency Department   Date of Visit:  10/6/2018           Disclosure     Insurance plans vary and the physician(s) referred by the ER may not be covered by your plan.  Please contac tell this physician (or your personal doctor if your instructions are to return to your personal doctor) about any new or lasting problems. The primary care or specialist physician will see patients referred from the [facility] Emergency Department.  Severo Pastures

## (undated) NOTE — LETTER
11/20/19        [address]  [address] [facility]  [place] [address]      Dear Ilir Mark,    [facility] records indicate that you have outstanding lab work and or testing that was ordered for you and has not yet been completed:  Orders Placed This Enco

## (undated) NOTE — IP AVS SNAPSHOT
[address]            (For Outpatient Use Only) Initial Admit Date: 2022   Inpt/Obs Admit Date: Inpt: 22 / Obs: N/A   Discharge Date:    Cass Rasmussen:  [de-identified]   MRN: [de-identified]   CSN: [id number]   CEID: [id number]        ENCOUNTER  Patient Class: Inpatient Admitting Provider: Cameron Hyatt MD Unit: [place] [address] Service: Medical Attending Provider: Dorinda Mckeon MD   Bed: 306-A   Visit Type:   Referring Physician: No ref. provider found Billing Flag:    Admit Diagnosis: Obstipation [K59.00]      PATIENT  Legal Name:   Siri Land   Legal Sex: Female  Gender ID:              [address] [facility] Name:    PCP:   Home: [phone]   Address:  [address] [place] [address] : 3/15/1930 (89 yrs) Mobile: [phone]         City/State/Zip: [address] [facility] Marital:  Language: Linda park: Thuy SSN4: [id number] Buddhism: [address] [facility]     Race: White Ethnicity: Non  Or [address] [place] [address] Street   Name Relationship Legal Guardian? Home Phone Work Phone Mobile Phone   1. Nikita Gupta  2.  Nataly Loza Son  Daughter No  No   21      GUARANTOR  Guarantor: Siri Land : 3/15/1930 Home Phone: [phone]   Address: [address] [place] [address]  Sex: Female Work Phone:    City/State/Zip: [address]   Rel. to Patient: Self Guarantor ID: [id number]   GUARANTOR EMPLOYER   Employer:  Status: RETIRED     COVERAGE  PRIMARY INSURANCE   Payor: MEDICARE Plan: MEDICARE PART A&B   Group Number:  Insurance Type: INDEMNITY   Subscriber Name: Nevaeh Dillard : 03/15/1930   Subscriber ID: [id number] Pt Rel to Subscriber: Self   SECONDARY INSURANCE   Payor[place] Plan: [facility]   Group Number: [id number] Insurance Type: [address] Name: Nevaeh Dillard : 3/15/1930   Subscriber ID: [id number] Pt Rel to Subscriber: SELF   TERTIARY INSURANCE   Payor:  Plan: Group Number:  Insurance Type:    Subscriber Name:  Subscriber :    Subscriber ID:  Pt Rel to Subscriber:    Hospital Account Financial Class: Medicare    2022

## (undated) NOTE — ED AVS SNAPSHOT
Doyle Christine   MRN: [id number]    Department:  [facility] Emergency Department   Date of Visit:  8/10/2019           Disclosure     Insurance plans vary and the physician(s) referred by the ER may not be covered by your plan.  Please contac tell this physician (or your personal doctor if your instructions are to return to your personal doctor) about any new or lasting problems. The primary care or specialist physician will see patients referred from the [facility] Emergency Department.  Jacqui Mark

## (undated) NOTE — IP AVS SNAPSHOT
[address]            (For Outpatient Use Only) Initial Admit Date: 6/14/2021   Inpt/Obs Admit Date: Inpt: 6/14/21 / Obs: N/A   Discharge Date:    Gaurav Posadas:  [de-identified]   MRN: [de-identified]   CSN: [id number]   CEID: [id number] INSURANCE   Payor:  Plan:    Group Number:  Insurance Type:    Subscriber Name:  Subscriber :    Subscriber ID:  Pt Rel to Subscriber:    Hospital Account Financial Class: Medicare    2021

## (undated) NOTE — IP AVS SNAPSHOT
[address]            (For Outpatient Use Only) Initial Admit Date: 7/26/2021   Inpt/Obs Admit Date: Inpt: 7/26/21 / Obs: N/A   Discharge Date:    Cynthia Copier:  [de-identified]   MRN: [de-identified]   CSN: [id number]   CEID: [id number] Rel to Subscriber: SELF   TERTIARY INSURANCE   Payor:  Plan:    Group Number:  Insurance Type:    Subscriber Name:  Subscriber :    Subscriber ID:  Pt Rel to Subscriber:    Hospital Account Financial Class: Medicare    2021

## (undated) NOTE — IP AVS SNAPSHOT
Patient Demographics     Address  [place]  [address] Phone  [phone] [facility])  [phone] (Mobile) *Preferred* E-mail Address  [email or web address]      Emergency Contact(s)     Name Relation Home Work Mobile    Fito Koehler 37 capsule by mouth daily. Enalapril Maleate 20 MG Tabs  Commonly known as: VASOTEC  Next dose due: Tomorrow 7/29/2021      Take 20 mg by mouth daily. enoxaparin 80 MG/0.8ML Soln  Commonly known as: LOVENOX  Next dose due:  Tomorrow 7/29/2021 mg 07/28/21 0841 Given      [id number] warfarin (COUMADIN) tab 2.5 mg 07/27/21 2021 Given            LEFT LOWER ABDOMEN     Order ID Medication Name Action Time Action Reason Comments    [id number] Enoxaparin Sodium (LOVENOX) 80 MG/0.8ML injection 70 mg 07/2 ABG Base Excess 3.4 -2.0 - 2.0 mmol/L [facility])   ABG O2 Saturation 93 92 - 100 % — [facility])   Calculated O2 Saturation 94 92 - 100 % — [facility])   Patient Temperature 97.8 F — [facility] Difficile Toxin B Gene Positive    Rapid SARS-CoV-2 by PCR [[id number]]  (Normal) Collected: 07/26/21 1026    Order Status: Completed Lab Status: Final result Updated: 07/26/21 1059    Specimen: Other from Nares      Rapid SARS-CoV-2 by PCR Not Detected perennial rhinitis   • Balance problem 7/2008    chronic imbalance; gait imbalance-6/6/2012   • Carotid artery stenosis 3/19/2012    B/L internal    • Cataract    • 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 , Disp: , Rfl:   Enalapril Maleate 20 MG Oral Tab, Take 20 mg by mouth daily. , Disp: , Rfl:   QUEtiapine Fumarate 25 MG Oral Tab, Take 12.5 mg by mouth 2 (two) times daily.   , Disp: , Rfl:   Latanoprostene Bunod (VYZULTA) 0.024 % Oph 07/26/2021    COVID19 Not Detected 06/18/2021    COVID19 Not Detected 06/14/2021       Pro-Calcitonin  No results for input(s): PCT in the last 168 hours.     Cardiac  Recent Labs   Lab 07/26/21  1026   TROP <0.045       Creatinine Kinase  No results for in Birth: 3/15/1930  Medical Record Number: [id number]  Consulting Physician: Viv Watkins M.D. Referring Physician: Chikis Cannon MD    Date of Consultation: 7/27/2021      Reason for Consultation (Chief Complaint)  Pulmonary embolism.      History of syndrome)    • Internal hemorrhoids 3/3/2005   • Irregular heart beat 2003   • Lumbar degenerative disc disease 12/27/2011   • Lumbar spinal stenosis 12/27/2011   • Macular hole    • Miscarriage 1959   • Osteoarthritis, multiple sites 12/27/2011   • Benito Chandler Acidophilus/Pectin (PROBIOTIC) CAPS 1 capsule, 1 capsule, Oral, BID  [COMPLETED] iohexol (OMNIPAQUE) 350 MG/ML injection 100 mL, 100 mL, Intravenous, ONCE PRN  [COMPLETED] Enoxaparin Sodium (LOVENOX) 80 MG/0.8ML injection 70 mg, 1 mg/kg, Subcutaneous, Once clear; sclera anicteric. ENMT External nose normal; external ears normal.  Neck No JVD. Hematologic/Lymphatic No petechiae or purpura. Respiratory Normal effort; no respiratory distress. Cardiovascular Regular rate and rhythm. Abdomen Not distended. g/dL    Globulin  3.3 2.8 - 4.4 g/dL    A/G Ratio 0.9 (L) 1.0 - 2.0   Blood Culture    Collection Time: 07/26/21 10:26 AM    Specimen: Blood,peripheral   Result Value Ref Range    Blood Culture Result No Growth 1 Day    Lactic Acid, Plasma    Collection Ti Positive (A) Negative   Basic Metabolic Panel (8)    Collection Time: 07/27/21  5:24 AM   Result Value Ref Range    Glucose 82 70 - 99 mg/dL    Sodium 139 136 - 145 mmol/L    Potassium 3.9 3.5 - 5.1 mmol/L    Chloride 105 98 - 112 mmol/L    CO2 27.0 21.0 - CARDIAC:  No enlargement or pericardial thickening.  Coronary artery calcifications.    PLEURA:  No mass or effusion.     CHEST WALL:  No mass or axillary adenopathy.     LIMITED ABDOMEN:  Limited images of the upper abdomen are unremarkable.     BONES:  L nonocclusive thrombus in the left lower extremity.       Critical value test [facility] with accurate read back.  1042 hours.               Dictated by (CST): Jessica Miranda MD on 7/27/2021 at 10:42 AM       Finalized by (CST): Jessica Miranda MD o her baseline level of function and has no skilled inpt PT needs at this time. Pt has caregivers at home who assist pt with all functional mobility and ADL and home PT. Family tried katiuska lift and pt was unable to tolerate. Will sign off.  Thank you.[KB.1] Akhil Mason OT at 7/27/2021  9:33 AM  Version 1 of 1    Author: Franchesca Jackson OT Service: Rehab Author Type: Occupational Therapist    Filed: 7/27/2021  9:33 AM Date of Service: 7/27/2021  9:31 AM Status: Signed    : CAR Shin

## (undated) NOTE — MR AVS SNAPSHOT
[address] [address] [phone]               Thank you for choosing us for your health care visit with Kishan Broderick MD.  We are glad to serve you and happy to provide you with Diagnoses:  Chronic bilateral low back pain without sciatica   At high risk for falls   Order:  Op Referral To Santana Amaro   [address] [facility] 911 [facility], [address] [place] [address], [place] [address] [facility] in [facility] and [facility]  [facility], [facility], [address] [place] [address]    [address]: [phone]    Jessie DELGADO, [place] [address] At [facility]  Annually for Diabetics No results found for: A1C, HGBA1C No flowsheet data found.    NA    Fasting Blood Sugar (FSB)   Patient must be diagnosed with one of the following:   • Hypertension   • Dyslipidemia   • Obesity (BMI ³30 kg/m2)   • Previous Flex Sigmoidoscopy Screen  Covered every 5 years No results found for this or any previous visit. No flowsheet data found. Fecal Occult Blood   Covered Annually No results found for: FOB, OCCULTSTOOL No flowsheet data found.      Barium Enema-   uncomf Covered Once after 65 No orders found for this or any previous visit. Please get once after your 65th birthday  DONE    Hepatitis B for Moderate/High Risk       No orders found for this or any previous visit.  Medium/high risk factors:   End-stage renal dis CANOLA AND SOY OIL-Reaction: intestinal upset    Celebrex [Celecoxib]     Food     Preservatives-intestinal upset    Hydromorphone     Novocain     Reaction: HA's, GI    Nsaids     Reaction: GI    Patanase     Reaction: SOB    Singulair Dizziness    Wool ? Keep floors clear of clutter. ? Make sure carpets and area rugs have skid proof backing. ? Do not use slippery wax on bare floors. ? Keep furniture in its accustomed place. ? If you have pets, be careful that you don’t trip over them.     OUTSIDE SAF

## (undated) NOTE — LETTER
Patient Name: Deion Lobo  YOB: 1930          MRN number:  [id number]  Date:  3/27/2019  Referring Physician:  Sudie Phoenix     Discharge Summary    Pt has attended 4, cancelled 0, and no shown 0 visits in Occupational Therapy. 1- Pt will be independent in daily skin care. 2 sessions . MET  2-  Pt will tolerate bilat LE modified compression via Tensogrip sleeve for 8 hours while sleeping. 2 sessions . Not needed. 3-  Pt will be independent in decongestive exercises.  3 sessions

## (undated) NOTE — LETTER
01/29/19        [address]  [address] [address]      Dear Ric Cleveland,    [facility] records indicate that you have outstanding lab work and or testing that was ordered for you and has not yet been completed:  Orders Placed This [place]